# Patient Record
Sex: FEMALE | Race: BLACK OR AFRICAN AMERICAN | NOT HISPANIC OR LATINO | Employment: OTHER | URBAN - METROPOLITAN AREA
[De-identification: names, ages, dates, MRNs, and addresses within clinical notes are randomized per-mention and may not be internally consistent; named-entity substitution may affect disease eponyms.]

---

## 2017-09-25 PROBLEM — R60.0 BILATERAL LOWER EXTREMITY EDEMA: Status: ACTIVE | Noted: 2017-09-25

## 2017-09-25 PROBLEM — D63.1 ANEMIA OF CHRONIC KIDNEY FAILURE: Status: ACTIVE | Noted: 2017-09-25

## 2017-09-25 PROBLEM — N18.4 CKD (CHRONIC KIDNEY DISEASE) STAGE 4, GFR 15-29 ML/MIN: Status: ACTIVE | Noted: 2017-09-25

## 2017-09-25 PROBLEM — E11.21 DIABETIC NEPHROPATHY ASSOCIATED WITH TYPE 2 DIABETES MELLITUS: Status: ACTIVE | Noted: 2017-09-25

## 2017-09-25 PROBLEM — D50.9 IDA (IRON DEFICIENCY ANEMIA): Status: ACTIVE | Noted: 2017-09-25

## 2017-09-25 PROBLEM — I10 HTN (HYPERTENSION): Status: ACTIVE | Noted: 2017-09-25

## 2017-09-25 PROBLEM — N18.9 ANEMIA OF CHRONIC KIDNEY FAILURE: Status: ACTIVE | Noted: 2017-09-25

## 2017-09-25 PROBLEM — E88.09 HYPOALBUMINEMIA: Status: ACTIVE | Noted: 2017-09-25

## 2017-09-25 PROBLEM — E55.9 VITAMIN D DEFICIENCY: Status: ACTIVE | Noted: 2017-09-25

## 2017-09-25 PROBLEM — N25.81 SECONDARY HYPERPARATHYROIDISM OF RENAL ORIGIN: Status: ACTIVE | Noted: 2017-09-25

## 2018-08-14 ENCOUNTER — TELEPHONE (OUTPATIENT)
Dept: SURGERY | Facility: CLINIC | Age: 63
End: 2018-08-14

## 2018-08-14 NOTE — TELEPHONE ENCOUNTER
8/14/18  1:06pm  Attempted to reach patient to inform her of need to reschedule appointment today. No answer. No voicemail setup.     8/14/18  1:10pm  Attempted to reach patient to inform her of need to reschedule appointment today. No answer. Message left on answering machine.     8/14/18  1:14pm  Attempted to reach patient's daughter to inform her of need to reschedule patient's appointment today. No answer. Message left via voicemail.

## 2018-09-18 ENCOUNTER — OFFICE VISIT (OUTPATIENT)
Dept: SURGERY | Facility: CLINIC | Age: 63
End: 2018-09-18
Payer: MEDICARE

## 2018-09-18 VITALS
TEMPERATURE: 99 F | SYSTOLIC BLOOD PRESSURE: 152 MMHG | BODY MASS INDEX: 40.69 KG/M2 | DIASTOLIC BLOOD PRESSURE: 74 MMHG | HEART RATE: 81 BPM | HEIGHT: 64 IN | WEIGHT: 238.31 LBS

## 2018-09-18 DIAGNOSIS — E11.21 DIABETIC NEPHROPATHY ASSOCIATED WITH TYPE 2 DIABETES MELLITUS: ICD-10-CM

## 2018-09-18 DIAGNOSIS — N18.4 CKD (CHRONIC KIDNEY DISEASE) STAGE 4, GFR 15-29 ML/MIN: Primary | ICD-10-CM

## 2018-09-18 PROCEDURE — 99999 PR PBB SHADOW E&M-EST. PATIENT-LVL IV: CPT | Mod: PBBFAC,,, | Performed by: STUDENT IN AN ORGANIZED HEALTH CARE EDUCATION/TRAINING PROGRAM

## 2018-09-18 PROCEDURE — 99214 OFFICE O/P EST MOD 30 MIN: CPT | Mod: PBBFAC,PO | Performed by: STUDENT IN AN ORGANIZED HEALTH CARE EDUCATION/TRAINING PROGRAM

## 2018-09-18 PROCEDURE — 99204 OFFICE O/P NEW MOD 45 MIN: CPT | Mod: S$PBB,,, | Performed by: STUDENT IN AN ORGANIZED HEALTH CARE EDUCATION/TRAINING PROGRAM

## 2018-09-18 NOTE — PROGRESS NOTES
Patient ID: Belinda Kelly is a 63 y.o. female.    Chief Complaint: No chief complaint on file.      HPI:  63F with ESRD secondary to DM and htn. She was diagnosed with DM at age 28. She has never had dialysis before. She does make urine. Complains of some SOB going up a flight of stairs. No chest pain. She sees dr karimi with nephrology who referred her here for placement of embedded PD catheter for anticipation of needing PD in the future.        Review of Systems   Constitutional: Negative for fever.   HENT: Negative for trouble swallowing.    Respiratory: Positive for shortness of breath.    Cardiovascular: Positive for leg swelling. Negative for chest pain.   Gastrointestinal: Negative for abdominal pain, blood in stool, nausea and vomiting.   Genitourinary: Negative for decreased urine volume and dysuria.   Musculoskeletal: Negative for gait problem.   Skin: Negative for rash and wound.   Allergic/Immunologic: Negative for immunocompromised state.   Neurological: Negative for weakness.   Hematological: Does not bruise/bleed easily.   Psychiatric/Behavioral: Negative for agitation.       Current Outpatient Medications   Medication Sig Dispense Refill    acetaminophen (TYLENOL) 325 MG tablet Take 325 mg by mouth every 6 (six) hours as needed for Pain.      allopurinol (ZYLOPRIM) 100 MG tablet Take 1 tablet (100 mg total) by mouth once daily. 30 tablet 11    amlodipine (NORVASC) 10 MG tablet Take 10 mg by mouth once daily.      aspirin (ECOTRIN) 81 MG EC tablet Take 81 mg by mouth once daily.      calcitRIOL (ROCALTROL) 0.25 MCG Cap Take 1 capsule (0.25 mcg total) by mouth once daily. 90 capsule 3    calcium carbonate (OS-SHARON) 600 mg (1,500 mg) Tab Take 600 mg by mouth once daily.      carvedilol (COREG) 25 MG tablet Take 1 tablet (25 mg total) by mouth 2 (two) times daily. 180 tablet 3    cetirizine (ZYRTEC) 10 MG tablet TK 1 T PO QD  0    cloNIDine (CATAPRES) 0.2 MG tablet Take 0.3 mg by mouth 2  (two) times daily.      famotidine (PEPCID) 40 MG tablet Take 40 mg by mouth once daily.      ferrous sulfate 324 mg (65 mg iron) TbEC Take 325 mg by mouth every other day.      furosemide (LASIX) 80 MG tablet Take 0.5 tablets (40 mg total) by mouth once daily. 90 tablet 3    hydrALAZINE (APRESOLINE) 100 MG tablet Take 1 tablet (100 mg total) by mouth 3 (three) times daily. 270 tablet 3    HYDROcodone-acetaminophen (NORCO) 5-325 mg per tablet 2 (two) times daily as needed.  0    insulin aspart (NOVOLOG) 100 unit/mL injection Inject 15 Units into the skin 3 (three) times daily before meals.      isosorbide dinitrate (ISORDIL) 10 MG tablet Take 3 tablets (30 mg total) by mouth 3 (three) times daily. 270 tablet 3    losartan (COZAAR) 50 MG tablet Take 1 tablet (50 mg total) by mouth once daily. 90 tablet 3    minoxidil (LONITEN) 2.5 MG tablet Take 2 tablets (5 mg total) by mouth 2 (two) times daily. 120 tablet 11    sitagliptin (JANUVIA) 100 MG Tab Take 100 mg by mouth once daily.      spironolactone (ALDACTONE) 25 MG tablet Take 25 mg by mouth once daily.      tramadol (ULTRAM) 50 mg tablet Take 50 mg by mouth every 24 hours as needed for Pain.      TRESIBA FLEXTOUCH U-200 200 unit/mL (3 mL) InPn INJ 50 UNI SC QAM AND 50 UNI QPM  0    UNABLE TO FIND Inject 2 Units into the skin once daily. medication name:- Victoza 2 units in am      VENTOLIN HFA 90 mcg/actuation inhaler INHALE 2 PUFFS PO Q 6 H PRN  1     Current Facility-Administered Medications   Medication Dose Route Frequency Provider Last Rate Last Dose    perflutren protein-A microsphr 0.22 mg/mL IV susp  1 mL Intravenous Once Moni Deng MD           Review of patient's allergies indicates:   Allergen Reactions    Strawberries [strawberry] Hives and Itching       Past Medical History:   Diagnosis Date    Anemia     Chronic kidney disease-mineral and bone disorder     CKD (chronic kidney disease) stage 4, GFR 15-29 ml/min     CKD  (chronic kidney disease) stage 5, GFR less than 15 ml/min     Diabetes mellitus     Diabetic nephropathy     Disorder of kidney and ureter     Edema     Hypertension     Hyperuricemia     Hypoalbuminemia     Nephrotic syndrome     Protein malnutrition     Proteinuria     Secondary hyperparathyroidism     Secondary hyperparathyroidism of renal origin     Vitamin D deficiency        Past Surgical History:   Procedure Laterality Date    APPENDECTOMY      BUZOTL-AGRXOL-NC N/A 10/28/2015    Performed by [facility]     SECTION      HYSTERECTOMY         Family History   Problem Relation Age of Onset    Hypertension Mother     Diabetes Mother     Heart disease Mother     Hypertension Father     Diabetes Father     Liver disease Father        Social History     Socioeconomic History    Marital status: Single     Spouse name: Not on file    Number of children: Not on file    Years of education: Not on file    Highest education level: Not on file   Social Needs    Financial resource strain: Not on file    Food insecurity - worry: Not on file    Food insecurity - inability: Not on file    Transportation needs - medical: Not on file    Transportation needs - non-medical: Not on file   Occupational History    Not on file   Tobacco Use    Smoking status: Never Smoker    Smokeless tobacco: Never Used   Substance and Sexual Activity    Alcohol use: No    Drug use: No    Sexual activity: Not Currently   Other Topics Concern    Not on file   Social History Narrative    Not on file       Vitals:    18 1439   BP: (!) 152/74   Pulse: 81   Temp: 98.5 °F (36.9 °C)       Physical Exam   Constitutional: She is oriented to person, place, and time. She appears well-developed and well-nourished. No distress.   HENT:   Head: Normocephalic and atraumatic.   Eyes: No scleral icterus.   Cardiovascular: Normal rate.   Pulmonary/Chest: Effort normal. No stridor.    Abdominal: Soft. She exhibits no distension. There is no tenderness.   Lymphadenopathy:     She has no cervical adenopathy.   Neurological: She is alert and oriented to person, place, and time.   Skin: Skin is warm. No erythema.        Psychiatric: She has a normal mood and affect. Her behavior is normal.     GFR 13 from 18 a month ago and 18 a year ago      Assessment & Plan:   63F with ESRD seen in consultation for placement of PD catheter  She is agreeable at this time to have the catheter placed. Explained the procedure. She agrees to proceed  Plan for Oct 23 per patient request

## 2018-10-18 ENCOUNTER — CLINICAL SUPPORT (OUTPATIENT)
Dept: LAB | Facility: HOSPITAL | Age: 63
End: 2018-10-18
Attending: STUDENT IN AN ORGANIZED HEALTH CARE EDUCATION/TRAINING PROGRAM
Payer: MEDICARE

## 2018-10-18 ENCOUNTER — HOSPITAL ENCOUNTER (OUTPATIENT)
Dept: PREADMISSION TESTING | Facility: HOSPITAL | Age: 63
Discharge: HOME | End: 2018-10-18
Attending: INTERNAL MEDICINE
Payer: MEDICARE

## 2018-10-18 ENCOUNTER — ANESTHESIA EVENT (OUTPATIENT)
Dept: SURGERY | Facility: HOSPITAL | Age: 63
End: 2018-10-18
Payer: MEDICARE

## 2018-10-18 VITALS
RESPIRATION RATE: 18 BRPM | SYSTOLIC BLOOD PRESSURE: 183 MMHG | DIASTOLIC BLOOD PRESSURE: 89 MMHG | HEART RATE: 82 BPM | HEIGHT: 64 IN | BODY MASS INDEX: 40.44 KG/M2 | WEIGHT: 236.88 LBS | OXYGEN SATURATION: 95 % | TEMPERATURE: 98 F

## 2018-10-18 DIAGNOSIS — I10 ESSENTIAL HYPERTENSION: Primary | ICD-10-CM

## 2018-10-18 DIAGNOSIS — I10 ESSENTIAL HYPERTENSION: ICD-10-CM

## 2018-10-18 PROCEDURE — 93005 ELECTROCARDIOGRAM TRACING: CPT

## 2018-10-18 RX ORDER — LIDOCAINE HYDROCHLORIDE 10 MG/ML
1 INJECTION, SOLUTION EPIDURAL; INFILTRATION; INTRACAUDAL; PERINEURAL ONCE
Status: CANCELLED | OUTPATIENT
Start: 2018-10-18 | End: 2018-10-18

## 2018-10-18 RX ORDER — SODIUM CHLORIDE 9 MG/ML
INJECTION, SOLUTION INTRAVENOUS CONTINUOUS
Status: CANCELLED | OUTPATIENT
Start: 2018-10-18

## 2018-10-18 NOTE — ANESTHESIA PREPROCEDURE EVALUATION
10/18/2018  Belinda Kelly is a 63 y.o., female  With LUIZA not using CPAP is scheduled for laparoscopic insertion of peritoneal dialysis catheter under [facility] on 10/23/2018.    Past Surgical History:   Procedure Laterality Date    APPENDECTOMY      FKYIXJ-ASGOJX-OR N/A 10/28/2015    Performed by [facility] at [facility] OR     SECTION  ,     HYSTERECTOMY            Anesthesia Evaluation    I have reviewed the Patient Summary Reports.    I have reviewed the Nursing Notes.   I have reviewed the Medications.     Review of Systems  Anesthesia Hx:  No problems with previous Anesthesia  History of prior surgery of interest to airway management or planning: Previous anesthesia: General  Denies Personal Hx of Anesthesia complications.   Social:  Non-Smoker, No Alcohol Use    Hematology/Oncology:  Hematology Normal      Hematology Comments: Aspirin on hold starting today; will resume 24 hours after procedure   EENT/Dental:   chronic allergic rhinitis   Cardiovascular:   Exercise tolerance: good Hypertension, well controlled Denies Dysrhythmias.   Denies Angina.        Pulmonary:   Asthma (has not used rescue inhaler in over 1 year) mild and asymptomatic Denies Shortness of breath. Sleep Apnea (does not use CPAP)    Renal/:   Chronic Renal Disease, ESRD  Kidney Function/Disease, Chronic Kidney Disease (CKD) , CKD Stage IV (GFR 15-29)    Hepatic/GI:  Hepatic/GI Normal    Neurological:  Neurology Normal    Endocrine:   Diabetes, poorly controlled, type 2, using insulin  Diabetes, Type 2 Diabetes , controlled by insulin. , most recent HgA1c value was 8.4 on 2018.  Parathyroid Disease, Hyperparathyroidism, Tertiary Hyperparathyroidism        Physical Exam  General:  Morbid Obesity    Airway/Jaw/Neck:  Airway Findings: Mouth Opening: Normal Tongue: Normal  General Airway Assessment:  Adult  Mallampati: III  TM Distance: Normal, at least 6 cm  Jaw/Neck Findings:  Neck ROM: Extension Decreased, Mild  Neck Findings:  Girth Increased, Short Neck     Eyes/Ears/Nose:  EYES/EARS/NOSE FINDINGS: Normal   Dental:  Dental Findings: In tact, Periodontal disease, Mild   Chest/Lungs:  Chest/Lungs Clear    Heart/Vascular:  Heart Findings: Normal Heart murmur: negative    Abdomen:  Abdomen Findings: Normal      Mental Status:  Mental Status Findings: Normal        Anesthesia Plan  Type of Anesthesia, risks & benefits discussed:  Anesthesia Type:  general  Patient's Preference:   Intra-op Monitoring Plan:   Intra-op Monitoring Plan Comments:   Post Op Pain Control Plan:   Post Op Pain Control Plan Comments:   Induction:   IV  Beta Blocker:         Informed Consent: Patient understands risks and agrees with Anesthesia plan.  Questions answered.   ASA Score: 4     Day of Surgery Review of History & Physical:        Anesthesia Plan Notes: Anesthesia consent will be obtained prior to procedure on 10/23/2018.        Ready For Surgery From Anesthesia Perspective.

## 2018-10-18 NOTE — DISCHARGE INSTRUCTIONS
Your surgery is scheduled for 10/23.    Please report to Outpatient Surgery Intake Office on the 2nd FLOOR at 0530 a.m.          INSTRUCTIONS IMPORTANT!!!  ¨ Do not eat or drink after 12 midnight-including water. OK to brush teeth, no   gum, candy or mints!    ¨ Take only these medicines with a small swallow of water-morning of surgery  .Amlodipine, Clonidine, Carvediol        ____  Proceed to [facility] on 8/18 for additional blood test.        ____  Do not wear makeup, including mascara.  ____  No powder, lotions or creams to surgical area.  ____  Please remove all jewelry, including piercings and leave at home.  ____  No money or valuables needed. Please leave at home.  ____  Please bring any documents given by your doctor.  ____  If going home the same day, arrange for a ride home. You will not be able to             drive if Anesthesia was used.  ____  Children under 18 years require a parent / guardian present the entire time             they are in surgery / recovery.  ____  Wear loose fitting clothing. Allow for dressings, bandages.  ____  Stop Aspirin, Ibuprofen, Motrin and Aleve at least 3-5 days before surgery, unless otherwise instructed by your doctor, or the nurse.   You MAY use Tylenol/acetaminophen until day of surgery.  ____  Wash the surgical area with Hibiclens the night before surgery, and again the             morning of surgery.  Be sure to rinse hibiclens off completely (if instructed by   nurse).  ____  If you take diabetic medication, do not take am of surgery unless instructed by Doctor.  ____  Call MD for temperature above 101 degrees or any other signs of infection such as Urinary (bladder) infection, Upper respiratory infection, skin boils, etc.  ____ Stop taking any Fish Oil supplement or any Vitamins that contain Vitamin E at least 5 days prior to surgery.  ____ Do Not wear your contact lenses the day of your procedure.  You may wear your glasses.      ____Do not shave  for 3 days prior to surgery.  ____ Practice Good hand washing before, during, and after procedure.      I have read or had read and explained to me, and understand the above information.  Additional comments or instructions:  For additional questions call [phone]      Pre-Op Bathing Instructions    Before surgery, you can play an important role in your own health.    Because skin is not sterile, we need to be sure that your skin is as free of germs as possible. By following the instructions below, you can reduce the number of germs on your skin before surgery.    IMPORTANT: You will need to shower with a special soap called Hibiclens*, available at any pharmacy.  If you are allergic to Chlorhexidine (the antiseptic in Hibiclens), use an antibacterial soap such as Dial Soap for your preoperative shower.  You will shower with Hibiclens both the night before your surgery and the morning of your surgery.  Do not use Hibiclens on the head, face or genitals to avoid injury to those areas.    STEP #1: THE NIGHT BEFORE YOUR SURGERY     1. Do not shave the area of your body where your surgery will be performed.  2. Shower and wash your hair and body as usual with your normal soap and shampoo.  3. Rinse your hair and body thoroughly after you shower to remove all soap residue.  4. With your hand, apply one packet of Hibiclens soap to the surgical site.   5. Wash the site gently for five (5) minutes. Do not scrub your skin too hard.   6. Do not wash with your regular soap after Hibiclens is used.  7. Rinse your body thoroughly.  8. Pat yourself dry with a clean, soft towel.  9. Do not use lotion, cream, or powder.  10. Wear clean clothes.    STEP #2: THE MORNING OF YOUR SURGERY     1. Repeat Step #1.    * Not to be used by people allergic to Chlorhexidine.      PD Catheter Access: Placing the Catheter  Your kidneys filter and remove waste from your blood. When they fail, this work must be done some other way. Peritoneal  dialysis (PD) is a treatment that can take over when your kidneys stop working. The peritoneum is the membrane lining the inside of your (belly) abdomen. PD uses the lining of your abdomen as a filter for your blood. Before PD can be done, an opening into this lining (an access) must be made. The access for PD is a soft tube called a catheter placed into your abdomen.    Placing the catheter  · A nurse or anesthesiologist gives you medicine so you dont feel pain during surgery.  · A small opening is made just below your belly button (navel). The catheter is placed through this opening.  · One end of the catheter sits in your abdomen. A few inches of the other end comes out an exit site in your skin. This end is clamped off and capped when its not being used.  · Typically, a part of the catheter goes through a tunnel made underneath the skin before it enters your abdomen. This tunnel helps prevent infections from entering your abdomen. It also holds the catheter in place to keep it from falling out.  Date Last Reviewed: 1/1/2017 © 2000-2017 [employer]. [address] [place] [address]. All rights reserved. This information is not intended as a substitute for professional medical care. Always follow your healthcare professional's instructions.        Anesthesia: General Anesthesia     You are watched continuously during your procedure by your anesthesia provider.     Youre due to have surgery. During surgery, youll be given medicine called anesthesia or anesthetic. This will keep you comfortable and pain-free. Your anesthesia provider will use general anesthesia.  What is general anesthesia?  General anesthesia puts you into a state like deep sleep. It goes into the bloodstream (IV anesthetics), into the lungs (gas anesthetics), or both. You feel nothing during the procedure. You will not remember it. During the procedure, the anesthesia provider monitors you continuously. He or she checks  your heart rate and rhythm, blood pressure, breathing, and blood oxygen.  · IV anesthetics. IV anesthetics are given through an IV line in your arm. Theyre often given first. This is so you are asleep before a gas anesthetic is started. Some kinds of IV anesthetics relieve pain. Others relax you. Your doctor will decide which kind is best in your case.  · Gas anesthetics. Gas anesthetics are breathed into the lungs. They are often used to keep you asleep. They can be given through a facemask or a tube placed in your larynx or trachea (breathing tube).  ¨ If you have a facemask, your anesthesia provider will most likely place it over your nose and mouth while youre still awake. Youll breathe oxygen through the mask as your IV anesthetic is started. Gas anesthetic may be added through the mask.  ¨ If you have a tube in the larynx or trachea, it will be inserted into your throat after youre asleep.  Anesthesia tools and medicines  You will likely have:  · IV anesthetics. These are put into an IV line into your bloodstream.  · Gas anesthetics. You breathe these anesthetics into your lungs, where they pass into your bloodstream.  · Pulse oximeter. This is a small clip that is attached to the end of your finger. This measures your blood oxygen level.  · Electrocardiography leads (electrodes). These are small sticky pads that are placed on your chest. They record your heart rate and rhythm.  · Blood pressure cuff. This reads your blood pressure.  Risks and possible complications  General anesthesia has some risks. These include:  · Breathing problems  · Nausea and vomiting  · Sore throat or hoarseness (usually temporary)  · Allergic reaction to the anesthetic  · Irregular heartbeat (rare)  · Cardiac arrest (rare)   Anesthesia safety  · Follow all instructions you are given for how long not to eat or drink before your procedure.  · Be sure your doctor knows what medicines and drugs you take. This includes  over-the-counter medicines, herbs, supplements, alcohol or other drugs. You will be asked when those were last taken.  · Have an adult family member or friend drive you home after the procedure.  · For the first 24 hours after your surgery:  ¨ Do not drive or use heavy equipment.  ¨ Do not make important decisions or sign legal documents. If important decisions or signing legal documents is necessary during the first 24 hours after surgery, have a trusted family member or spouse act on your behalf.  ¨ Avoid alcohol.  ¨ Have a responsible adult stay with you. He or she can watch for problems and help keep you safe.  Date Last Reviewed: 12/1/2016  © 0445-9847 [employer]. [address] [place] [address], [place] [address]. All rights reserved. This information is not intended as a substitute for professional medical care. Always follow your healthcare professional's instructions.

## 2018-10-18 NOTE — PLAN OF CARE
[facility] [phone]    Allergies, medical, surgical, family and psychosocial histories reviewed with patient. Periop plan of care reviewed. Preop instructions given, including medications to take and to hold. Hibiclens soap and instructions on use given. Time allotted for questions to be addressed.  Patient verbalized understanding.

## 2018-10-23 ENCOUNTER — HOSPITAL ENCOUNTER (OUTPATIENT)
Facility: HOSPITAL | Age: 63
Discharge: HOME | End: 2018-10-23
Attending: STUDENT IN AN ORGANIZED HEALTH CARE EDUCATION/TRAINING PROGRAM | Admitting: STUDENT IN AN ORGANIZED HEALTH CARE EDUCATION/TRAINING PROGRAM
Payer: MEDICARE

## 2018-10-23 ENCOUNTER — ANESTHESIA (OUTPATIENT)
Dept: SURGERY | Facility: HOSPITAL | Age: 63
End: 2018-10-23
Payer: MEDICARE

## 2018-10-23 DIAGNOSIS — E11.21 DIABETIC NEPHROPATHY ASSOCIATED WITH TYPE 2 DIABETES MELLITUS: ICD-10-CM

## 2018-10-23 DIAGNOSIS — N18.4 CKD (CHRONIC KIDNEY DISEASE) STAGE 4, GFR 15-29 ML/MIN: Primary | ICD-10-CM

## 2018-10-23 LAB
ANION GAP SERPL CALC-SCNC: 10 MMOL/L
ANION GAP SERPL CALC-SCNC: 10 MMOL/L
BUN SERPL-MCNC: 58 MG/DL
BUN SERPL-MCNC: 58 MG/DL
CALCIUM SERPL-MCNC: 9 MG/DL
CALCIUM SERPL-MCNC: 9 MG/DL
CHLORIDE SERPL-SCNC: 105 MMOL/L
CHLORIDE SERPL-SCNC: 105 MMOL/L
CO2 SERPL-SCNC: 24 MMOL/L
CO2 SERPL-SCNC: 24 MMOL/L
CREAT SERPL-MCNC: 3.5 MG/DL
CREAT SERPL-MCNC: 3.5 MG/DL
EST. GFR  (AFRICAN AMERICAN): 15 ML/MIN/1.73 M^2
EST. GFR  (AFRICAN AMERICAN): 15 ML/MIN/1.73 M^2
EST. GFR  (NON AFRICAN AMERICAN): 13 ML/MIN/1.73 M^2
EST. GFR  (NON AFRICAN AMERICAN): 13 ML/MIN/1.73 M^2
GLUCOSE SERPL-MCNC: 259 MG/DL
GLUCOSE SERPL-MCNC: 259 MG/DL
POTASSIUM SERPL-SCNC: 4.9 MMOL/L
POTASSIUM SERPL-SCNC: 4.9 MMOL/L
SODIUM SERPL-SCNC: 139 MMOL/L
SODIUM SERPL-SCNC: 139 MMOL/L

## 2018-10-23 PROCEDURE — 25000003 PHARM REV CODE 250: Performed by: NURSE PRACTITIONER

## 2018-10-23 PROCEDURE — 80048 BASIC METABOLIC PNL TOTAL CA: CPT

## 2018-10-23 PROCEDURE — 71000015 HC POSTOP RECOV 1ST HR: Performed by: STUDENT IN AN ORGANIZED HEALTH CARE EDUCATION/TRAINING PROGRAM

## 2018-10-23 PROCEDURE — 36000708 HC OR TIME LEV III 1ST 15 MIN: Performed by: STUDENT IN AN ORGANIZED HEALTH CARE EDUCATION/TRAINING PROGRAM

## 2018-10-23 PROCEDURE — 49324 LAP INSERT TUNNEL IP CATH: CPT | Mod: ,,, | Performed by: STUDENT IN AN ORGANIZED HEALTH CARE EDUCATION/TRAINING PROGRAM

## 2018-10-23 PROCEDURE — 25000003 PHARM REV CODE 250: Performed by: STUDENT IN AN ORGANIZED HEALTH CARE EDUCATION/TRAINING PROGRAM

## 2018-10-23 PROCEDURE — C1750 CATH, HEMODIALYSIS,LONG-TERM: HCPCS | Performed by: STUDENT IN AN ORGANIZED HEALTH CARE EDUCATION/TRAINING PROGRAM

## 2018-10-23 PROCEDURE — 36000709 HC OR TIME LEV III EA ADD 15 MIN: Performed by: STUDENT IN AN ORGANIZED HEALTH CARE EDUCATION/TRAINING PROGRAM

## 2018-10-23 PROCEDURE — 63600175 PHARM REV CODE 636 W HCPCS: Performed by: NURSE ANESTHETIST, CERTIFIED REGISTERED

## 2018-10-23 PROCEDURE — 36415 COLL VENOUS BLD VENIPUNCTURE: CPT

## 2018-10-23 PROCEDURE — 63600175 PHARM REV CODE 636 W HCPCS: Performed by: ANESTHESIOLOGY

## 2018-10-23 PROCEDURE — 71000039 HC RECOVERY, EACH ADD'L HOUR: Performed by: STUDENT IN AN ORGANIZED HEALTH CARE EDUCATION/TRAINING PROGRAM

## 2018-10-23 PROCEDURE — 37000009 HC ANESTHESIA EA ADD 15 MINS: Performed by: STUDENT IN AN ORGANIZED HEALTH CARE EDUCATION/TRAINING PROGRAM

## 2018-10-23 PROCEDURE — 71000033 HC RECOVERY, INTIAL HOUR: Performed by: STUDENT IN AN ORGANIZED HEALTH CARE EDUCATION/TRAINING PROGRAM

## 2018-10-23 PROCEDURE — 71000016 HC POSTOP RECOV ADDL HR: Performed by: STUDENT IN AN ORGANIZED HEALTH CARE EDUCATION/TRAINING PROGRAM

## 2018-10-23 PROCEDURE — 37000008 HC ANESTHESIA 1ST 15 MINUTES: Performed by: STUDENT IN AN ORGANIZED HEALTH CARE EDUCATION/TRAINING PROGRAM

## 2018-10-23 PROCEDURE — 63600175 PHARM REV CODE 636 W HCPCS: Performed by: STUDENT IN AN ORGANIZED HEALTH CARE EDUCATION/TRAINING PROGRAM

## 2018-10-23 PROCEDURE — 25000003 PHARM REV CODE 250: Performed by: NURSE ANESTHETIST, CERTIFIED REGISTERED

## 2018-10-23 DEVICE — KIT PERITONEAL DIALYS 62.0CM: Type: IMPLANTABLE DEVICE | Site: ABDOMEN | Status: FUNCTIONAL

## 2018-10-23 RX ORDER — LIDOCAINE HYDROCHLORIDE 10 MG/ML
INJECTION INFILTRATION; PERINEURAL
Status: DISCONTINUED | OUTPATIENT
Start: 2018-10-23 | End: 2018-10-23 | Stop reason: HOSPADM

## 2018-10-23 RX ORDER — PROPOFOL 10 MG/ML
VIAL (ML) INTRAVENOUS
Status: DISCONTINUED | OUTPATIENT
Start: 2018-10-23 | End: 2018-10-23

## 2018-10-23 RX ORDER — SODIUM CHLORIDE 9 MG/ML
INJECTION, SOLUTION INTRAVENOUS CONTINUOUS
Status: DISCONTINUED | OUTPATIENT
Start: 2018-10-23 | End: 2018-10-23 | Stop reason: HOSPADM

## 2018-10-23 RX ORDER — LIDOCAINE HYDROCHLORIDE 10 MG/ML
1 INJECTION, SOLUTION EPIDURAL; INFILTRATION; INTRACAUDAL; PERINEURAL ONCE
Status: DISCONTINUED | OUTPATIENT
Start: 2018-10-23 | End: 2018-10-23 | Stop reason: HOSPADM

## 2018-10-23 RX ORDER — SODIUM CHLORIDE 0.9 % (FLUSH) 0.9 %
3 SYRINGE (ML) INJECTION
Status: DISCONTINUED | OUTPATIENT
Start: 2018-10-23 | End: 2018-10-23 | Stop reason: HOSPADM

## 2018-10-23 RX ORDER — NEOSTIGMINE METHYLSULFATE 1 MG/ML
INJECTION, SOLUTION INTRAVENOUS
Status: DISCONTINUED | OUTPATIENT
Start: 2018-10-23 | End: 2018-10-23

## 2018-10-23 RX ORDER — MIDAZOLAM HYDROCHLORIDE 1 MG/ML
INJECTION INTRAMUSCULAR; INTRAVENOUS
Status: DISCONTINUED | OUTPATIENT
Start: 2018-10-23 | End: 2018-10-23

## 2018-10-23 RX ORDER — GLYCOPYRROLATE 0.2 MG/ML
INJECTION INTRAMUSCULAR; INTRAVENOUS
Status: DISCONTINUED | OUTPATIENT
Start: 2018-10-23 | End: 2018-10-23

## 2018-10-23 RX ORDER — CISATRACURIUM BESYLATE 2 MG/ML
INJECTION, SOLUTION INTRAVENOUS
Status: DISCONTINUED | OUTPATIENT
Start: 2018-10-23 | End: 2018-10-23

## 2018-10-23 RX ORDER — HYDROMORPHONE HYDROCHLORIDE 2 MG/ML
0.5 INJECTION, SOLUTION INTRAMUSCULAR; INTRAVENOUS; SUBCUTANEOUS EVERY 5 MIN PRN
Status: ACTIVE | OUTPATIENT
Start: 2018-10-23 | End: 2018-10-23

## 2018-10-23 RX ORDER — ONDANSETRON 2 MG/ML
4 INJECTION INTRAMUSCULAR; INTRAVENOUS ONCE AS NEEDED
Status: DISCONTINUED | OUTPATIENT
Start: 2018-10-23 | End: 2018-10-23 | Stop reason: HOSPADM

## 2018-10-23 RX ORDER — HYDROCODONE BITARTRATE AND ACETAMINOPHEN 5; 325 MG/1; MG/1
1 TABLET ORAL EVERY 4 HOURS PRN
Qty: 10 TABLET | Refills: 0 | Status: SHIPPED | OUTPATIENT
Start: 2018-10-23 | End: 2018-11-02 | Stop reason: SDUPTHER

## 2018-10-23 RX ORDER — SUCCINYLCHOLINE CHLORIDE 20 MG/ML
INJECTION INTRAMUSCULAR; INTRAVENOUS
Status: DISCONTINUED | OUTPATIENT
Start: 2018-10-23 | End: 2018-10-23

## 2018-10-23 RX ORDER — BUPIVACAINE HYDROCHLORIDE 2.5 MG/ML
INJECTION, SOLUTION EPIDURAL; INFILTRATION; INTRACAUDAL
Status: DISCONTINUED | OUTPATIENT
Start: 2018-10-23 | End: 2018-10-23 | Stop reason: HOSPADM

## 2018-10-23 RX ORDER — FENTANYL CITRATE 50 UG/ML
INJECTION, SOLUTION INTRAMUSCULAR; INTRAVENOUS
Status: DISCONTINUED | OUTPATIENT
Start: 2018-10-23 | End: 2018-10-23

## 2018-10-23 RX ORDER — HEPARIN SODIUM 5000 [USP'U]/ML
INJECTION, SOLUTION INTRAVENOUS; SUBCUTANEOUS
Status: DISCONTINUED | OUTPATIENT
Start: 2018-10-23 | End: 2018-10-23 | Stop reason: HOSPADM

## 2018-10-23 RX ORDER — LIDOCAINE HCL/PF 100 MG/5ML
SYRINGE (ML) INTRAVENOUS
Status: DISCONTINUED | OUTPATIENT
Start: 2018-10-23 | End: 2018-10-23

## 2018-10-23 RX ORDER — HYDROMORPHONE HYDROCHLORIDE 2 MG/ML
0.5 INJECTION, SOLUTION INTRAMUSCULAR; INTRAVENOUS; SUBCUTANEOUS EVERY 5 MIN PRN
Status: DISPENSED | OUTPATIENT
Start: 2018-10-23 | End: 2018-10-23

## 2018-10-23 RX ORDER — HYDROCODONE BITARTRATE AND ACETAMINOPHEN 5; 325 MG/1; MG/1
1 TABLET ORAL EVERY 4 HOURS PRN
Status: DISCONTINUED | OUTPATIENT
Start: 2018-10-23 | End: 2018-10-23 | Stop reason: HOSPADM

## 2018-10-23 RX ORDER — SODIUM CHLORIDE 0.9 % (FLUSH) 0.9 %
3 SYRINGE (ML) INJECTION EVERY 8 HOURS
Status: DISCONTINUED | OUTPATIENT
Start: 2018-10-23 | End: 2018-10-23 | Stop reason: HOSPADM

## 2018-10-23 RX ORDER — CEFAZOLIN SODIUM 2 G/50ML
2 SOLUTION INTRAVENOUS
Status: COMPLETED | OUTPATIENT
Start: 2018-10-23 | End: 2018-10-23

## 2018-10-23 RX ORDER — ONDANSETRON HYDROCHLORIDE 2 MG/ML
INJECTION, SOLUTION INTRAMUSCULAR; INTRAVENOUS
Status: DISCONTINUED | OUTPATIENT
Start: 2018-10-23 | End: 2018-10-23

## 2018-10-23 RX ADMIN — CISATRACURIUM BESYLATE 8 MG: 2 INJECTION INTRAVENOUS at 07:10

## 2018-10-23 RX ADMIN — SUCCINYLCHOLINE CHLORIDE 120 MG: 20 INJECTION, SOLUTION INTRAMUSCULAR; INTRAVENOUS at 07:10

## 2018-10-23 RX ADMIN — NEOSTIGMINE METHYLSULFATE 3 MG: 1 INJECTION, SOLUTION INTRAVENOUS at 08:10

## 2018-10-23 RX ADMIN — GLYCOPYRROLATE 0.2 MG: 0.2 INJECTION, SOLUTION INTRAMUSCULAR; INTRAVENOUS at 07:10

## 2018-10-23 RX ADMIN — HYDROMORPHONE HYDROCHLORIDE 0.5 MG: 2 INJECTION INTRAMUSCULAR; INTRAVENOUS; SUBCUTANEOUS at 10:10

## 2018-10-23 RX ADMIN — MIDAZOLAM HYDROCHLORIDE 2 MG: 1 INJECTION INTRAMUSCULAR; INTRAVENOUS at 06:10

## 2018-10-23 RX ADMIN — FENTANYL CITRATE 100 MCG: 50 INJECTION, SOLUTION INTRAMUSCULAR; INTRAVENOUS at 07:10

## 2018-10-23 RX ADMIN — Medication 100 MG: at 07:10

## 2018-10-23 RX ADMIN — NEOSTIGMINE METHYLSULFATE 2 MG: 1 INJECTION, SOLUTION INTRAVENOUS at 08:10

## 2018-10-23 RX ADMIN — GLYCOPYRROLATE 0.6 MG: 0.2 INJECTION, SOLUTION INTRAMUSCULAR; INTRAVENOUS at 08:10

## 2018-10-23 RX ADMIN — PROPOFOL 100 MG: 10 INJECTION, EMULSION INTRAVENOUS at 07:10

## 2018-10-23 RX ADMIN — ONDANSETRON HYDROCHLORIDE 8 MG: 2 INJECTION, SOLUTION INTRAMUSCULAR; INTRAVENOUS at 08:10

## 2018-10-23 RX ADMIN — GLYCOPYRROLATE 0.2 MG: 0.2 INJECTION, SOLUTION INTRAMUSCULAR; INTRAVENOUS at 08:10

## 2018-10-23 RX ADMIN — SODIUM CHLORIDE: 0.9 INJECTION, SOLUTION INTRAVENOUS at 06:10

## 2018-10-23 RX ADMIN — CISATRACURIUM BESYLATE 2 MG: 2 INJECTION INTRAVENOUS at 07:10

## 2018-10-23 RX ADMIN — HYDROCODONE BITARTRATE AND ACETAMINOPHEN 1 TABLET: 5; 325 TABLET ORAL at 10:10

## 2018-10-23 RX ADMIN — CEFAZOLIN SODIUM 2 G: 2 SOLUTION INTRAVENOUS at 07:10

## 2018-10-23 NOTE — PLAN OF CARE
Dr Velasquez at bedside to assess dressing. Stated drainage is fluid to irrigate during procedure, and to change dressing with gauze and tape if anymore drainage noted.

## 2018-10-23 NOTE — ANESTHESIA POSTPROCEDURE EVALUATION
"Anesthesia Post Evaluation    Patient: Belinda Kelly    Procedure(s) Performed: Procedure(s) (LRB):  INSERTION, CATHETER, DIALYSIS, PERITONEAL, LAPAROSCOPIC (N/A)    Final Anesthesia Type: general  Patient location during evaluation: PACU  Patient participation: Yes- Able to Participate  Level of consciousness: awake and alert  Post-procedure vital signs: reviewed and stable  Pain management: adequate  Airway patency: patent  PONV status at discharge: No PONV  Anesthetic complications: no      Cardiovascular status: blood pressure returned to baseline  Respiratory status: unassisted  Hydration status: euvolemic          Visit Vitals  BP (!) 169/79   Pulse 64   Temp 37.1 °C (98.8 °F) (Temporal)   Resp 12   Ht 5' 4" (1.626 m)   Wt 107 kg (236 lb)   LMP  (LMP Unknown)   SpO2 96%   BMI 40.51 kg/m²       Pain/Dylan Score: Pain Assessment Performed: Yes (10/23/2018  9:10 AM)  Presence of Pain: denies (10/23/2018  9:10 AM)  Dylan Score: 8 (10/23/2018  9:10 AM)        "
none

## 2018-10-23 NOTE — OP NOTE
DATE OF PROCEDURE: 10/23/2018    PREOPERATIVE DIAGNOSIS: CKD V    POSTOPERATIVE DIAGNOSIS: CKD V    PROCEDURE: Laparoscopic placement of imbedded peritoneal dialysis catheter, lysis of adhesions    SURGEON: Fransico Velasquez M.D.    ASSISTANT: None.    ANESTHESIA: General endotracheal prep chlorhexidine.    ESTIMATED BLOOD LOSS: Minimal.    SPECIMENS: None.    INDICATIONS: The patient is an 63 y.o. female, who has CKD V and is planning to initiate peritoneal dialysis. The risks of surgery were discussed with   the patient including bleeding, infection, pain, scar, wound complications,   injury to local structures, wanting more extensive surgery, and potential need   for further surgery. The patient demonstrated understanding of these risks and   a consent form was obtained.    PROCEDURE: The patient was identified in the Preoperative Unit and taken back    to the Operating Room, laid supine on the operating room table. IV antibiotics    were administered prior to the induction of general anesthesia. General    anesthesia was induced without complication. The patient was then prepped and    draped in a standard sterile fashion. Local anesthesia was infiltrated into the  skin and subcutaneous tissues to all the incision site. A 5 mm incision was    made in the Left upper quadrant with an #11 blade scalpel. visiport entrance was    Performed without injury.  There was noted to be some adhesions from previous surgery. These were taken down using blunt dissection.  An 8mm trocar was placed in the left lateral supra-umbilical location. It was tunneled  In the posterior rectus sheath for a few centimeters before entry into the peritoneum.    The catheter was then Positioned into the deep pelvis space.    It then tunneled in the subcutaneous space to a site superior and    lateral. The 2 cuffs were confirmed to be in the posterior rectus sheath and in    the subcutaneous space. The catheter was flushed and aspirated and then     positioned to gravity and good back flow was appreciated. The catheter tip was then buried and brought out through a LLQ incision. The tip was tied off with 0 silk after being flush with heparin. The skin incisions were  closed using 4-0 Monocryl suture in interrupted fashion. Sterile dressing was applied to the catheter. The patient was awakened    from general anesthesia without complication and returned to the Postoperative    Recovery Unit in stable condition. At the end of the case, sponge, instrument    and needle counts were correct on 2 occasions. I was present and scrubbed    throughout the entirety of the case.        COMPLICATIONS: None.    CONDITION: Stable.    IMPLANTS: Peritoneal dialysis catheter.

## 2018-10-23 NOTE — TRANSFER OF CARE
"Anesthesia Transfer of Care Note    Patient: Belinda Kelly    Procedure(s) Performed: Procedure(s) (LRB):  INSERTION, CATHETER, DIALYSIS, PERITONEAL, LAPAROSCOPIC (N/A)    Patient location: PACU    Anesthesia Type: general    Transport from OR: Transported from OR on 6-10 L/min O2 by face mask with adequate spontaneous ventilation    Post pain: adequate analgesia    Post assessment: no apparent anesthetic complications and tolerated procedure well    Post vital signs: stable    Level of consciousness: awake and oriented    Nausea/Vomiting: no nausea/vomiting    Complications: none    Transfer of care protocol was followed      Last vitals:   Visit Vitals  BP (!) 177/74 (BP Location: Left arm, Patient Position: Lying)   Pulse 72   Temp 36.1 °C (97 °F) (Temporal)   Resp 20   Ht 5' 4" (1.626 m)   Wt 107 kg (236 lb)   LMP  (LMP Unknown)   SpO2 95%   BMI 40.51 kg/m²     "
No

## 2018-10-23 NOTE — H&P
Chief Complaint: No chief complaint on file.        HPI:  63F with ESRD secondary to DM and htn. She was diagnosed with DM at age 28. She has never had dialysis before. She does make urine. Complains of some SOB going up a flight of stairs. No chest pain. She sees dr karimi with nephrology who referred her here for placement of embedded PD catheter for anticipation of needing PD in the future.           Review of Systems   Constitutional: Negative for fever.   HENT: Negative for trouble swallowing.    Respiratory: Positive for shortness of breath.    Cardiovascular: Positive for leg swelling. Negative for chest pain.   Gastrointestinal: Negative for abdominal pain, blood in stool, nausea and vomiting.   Genitourinary: Negative for decreased urine volume and dysuria.   Musculoskeletal: Negative for gait problem.   Skin: Negative for rash and wound.   Allergic/Immunologic: Negative for immunocompromised state.   Neurological: Negative for weakness.   Hematological: Does not bruise/bleed easily.   Psychiatric/Behavioral: Negative for agitation.                Current Outpatient Medications   Medication Sig Dispense Refill    acetaminophen (TYLENOL) 325 MG tablet Take 325 mg by mouth every 6 (six) hours as needed for Pain.        allopurinol (ZYLOPRIM) 100 MG tablet Take 1 tablet (100 mg total) by mouth once daily. 30 tablet 11    amlodipine (NORVASC) 10 MG tablet Take 10 mg by mouth once daily.        aspirin (ECOTRIN) 81 MG EC tablet Take 81 mg by mouth once daily.        calcitRIOL (ROCALTROL) 0.25 MCG Cap Take 1 capsule (0.25 mcg total) by mouth once daily. 90 capsule 3    calcium carbonate (OS-SHARON) 600 mg (1,500 mg) Tab Take 600 mg by mouth once daily.        carvedilol (COREG) 25 MG tablet Take 1 tablet (25 mg total) by mouth 2 (two) times daily. 180 tablet 3    cetirizine (ZYRTEC) 10 MG tablet TK 1 T PO QD   0    cloNIDine (CATAPRES) 0.2 MG tablet Take 0.3 mg by mouth 2 (two) times daily.         famotidine (PEPCID) 40 MG tablet Take 40 mg by mouth once daily.        ferrous sulfate 324 mg (65 mg iron) TbEC Take 325 mg by mouth every other day.        furosemide (LASIX) 80 MG tablet Take 0.5 tablets (40 mg total) by mouth once daily. 90 tablet 3    hydrALAZINE (APRESOLINE) 100 MG tablet Take 1 tablet (100 mg total) by mouth 3 (three) times daily. 270 tablet 3    HYDROcodone-acetaminophen (NORCO) 5-325 mg per tablet 2 (two) times daily as needed.   0    insulin aspart (NOVOLOG) 100 unit/mL injection Inject 15 Units into the skin 3 (three) times daily before meals.        isosorbide dinitrate (ISORDIL) 10 MG tablet Take 3 tablets (30 mg total) by mouth 3 (three) times daily. 270 tablet 3    losartan (COZAAR) 50 MG tablet Take 1 tablet (50 mg total) by mouth once daily. 90 tablet 3    minoxidil (LONITEN) 2.5 MG tablet Take 2 tablets (5 mg total) by mouth 2 (two) times daily. 120 tablet 11    sitagliptin (JANUVIA) 100 MG Tab Take 100 mg by mouth once daily.        spironolactone (ALDACTONE) 25 MG tablet Take 25 mg by mouth once daily.        tramadol (ULTRAM) 50 mg tablet Take 50 mg by mouth every 24 hours as needed for Pain.        TRESIBA FLEXTOUCH U-200 200 unit/mL (3 mL) InPn INJ 50 UNI SC QAM AND 50 UNI QPM   0    UNABLE TO FIND Inject 2 Units into the skin once daily. medication name:- Victoza 2 units in am        VENTOLIN HFA 90 mcg/actuation inhaler INHALE 2 PUFFS PO Q 6 H PRN   1                Current Facility-Administered Medications   Medication Dose Route Frequency Provider Last Rate Last Dose    perflutren protein-A microsphr 0.22 mg/mL IV susp  1 mL Intravenous Once Moni Deng MD                  Review of patient's allergies indicates:   Allergen Reactions    Strawberries [strawberry] Hives and Itching              Past Medical History:   Diagnosis Date    Anemia      Chronic kidney disease-mineral and bone disorder      CKD (chronic kidney disease) stage 4, GFR 15-29  ml/min      CKD (chronic kidney disease) stage 5, GFR less than 15 ml/min      Diabetes mellitus      Diabetic nephropathy      Disorder of kidney and ureter      Edema      Hypertension      Hyperuricemia      Hypoalbuminemia      Nephrotic syndrome      Protein malnutrition      Proteinuria      Secondary hyperparathyroidism      Secondary hyperparathyroidism of renal origin      Vitamin D deficiency                 Past Surgical History:   Procedure Laterality Date    APPENDECTOMY        [id number] N/A 10/28/2015     Performed by [facility]     SECTION        HYSTERECTOMY                   Family History   Problem Relation Age of Onset    Hypertension Mother      Diabetes Mother      Heart disease Mother      Hypertension Father      Diabetes Father      Liver disease Father           Social History            Socioeconomic History    Marital status: Single       Spouse name: Not on file    Number of children: Not on file    Years of education: Not on file    Highest education level: Not on file   Social Needs    Financial resource strain: Not on file    Food insecurity - worry: Not on file    Food insecurity - inability: Not on file    Transportation needs - medical: Not on file    Transportation needs - non-medical: Not on file   Occupational History    Not on file   Tobacco Use    Smoking status: Never Smoker    Smokeless tobacco: Never Used   Substance and Sexual Activity    Alcohol use: No    Drug use: No    Sexual activity: Not Currently   Other Topics Concern    Not on file   Social History Narrative    Not on file             Vitals:     18 1439   BP: (!) 152/74   Pulse: 81   Temp: 98.5 °F (36.9 °C)         Physical Exam   Constitutional: She is oriented to person, place, and time. She appears well-developed and well-nourished. No distress.   HENT:   Head: Normocephalic and atraumatic.   Eyes: No scleral icterus.    Cardiovascular: Normal rate.   Pulmonary/Chest: Effort normal. No stridor.   Abdominal: Soft. She exhibits no distension. There is no tenderness.   Lymphadenopathy:     She has no cervical adenopathy.   Neurological: She is alert and oriented to person, place, and time.   Skin: Skin is warm. No erythema.        Psychiatric: She has a normal mood and affect. Her behavior is normal.      GFR 13 from 18 a month ago and 18 a year ago        Assessment & Plan:   63F with ESRD seen in consultation for placement of PD catheter  She is agreeable at this time to have the catheter placed. Explained the procedure. She agrees to proceed  Plan for Oct 23 per patient request

## 2018-10-23 NOTE — DISCHARGE INSTRUCTIONS
ANESTHESIA  -For the first 24 hours after surgery:  Do not drive, use heavy equipment, make important decisions, or drink alcohol  -It is normal to feel sleepy for several hours.  Rest until you are more awake.  -Have someone stay with you, if needed.  They can watch for problems and help keep you safe.  -Some possible post anesthesia side effects include: nausea and vomiting, sore throat and hoarseness, sleepiness, and dizziness.    PAIN  -If you have pain after surgery, pain medicine will help you feel better.  Take it as directed, before pain becomes severe.  Most pain relievers taken by mouth need at least 20-30 minutes to start working.  -Do not drive or drink alcohol while taking pain medicine.  -Pain medication can upset your stomach.  Taking them with a little food may help.  -Other ways to help control pain: elevation, ice, and relaxation  -Call your surgeon if still having unmanageable pain an hour after taking pain medicine.  -Pain medicine can cause constipation.  Taking an over-the counter stool softener while on prescription pain medicine and drinking plenty of fluids can prevent this side effect.  -Call your surgeon if you have severe side effects like: breathing problems, trouble waking up, dizziness, confusion, or severe constipation.    NAUSEA  -Some people have nausea after surgery.  This is often because of anesthesia, pain, pain medicine, or the stress of surgery.  -Do not push yourself to eat.  Start off with clear liquids and soup.  Slowly move to solid foods.  Don't eat fatty, rich, spicy foods at first.  Eat smaller amounts.  -If you develop persistent nausea and vomiting please notify your surgeon immediately.    BLEEDING  -Different types of surgery require different types of care and dressing changes.  It is important to follow all instructions and advice from your surgeon.  Change dressing as directed.  Call your surgeon for any concerns regarding postop bleeding.    SIGNS OF  INFECTION  -Signs of infection include: fever, swelling, drainage, and redness  -Notify your surgeon if you have a fever of 100.4 F (38.0 C) or higher.  -Notify your surgeon if you notice redness, swelling, increased pain, pus, or a foul smell at the incision site.

## 2018-10-24 VITALS
DIASTOLIC BLOOD PRESSURE: 95 MMHG | OXYGEN SATURATION: 94 % | BODY MASS INDEX: 40.29 KG/M2 | HEART RATE: 66 BPM | SYSTOLIC BLOOD PRESSURE: 153 MMHG | WEIGHT: 236 LBS | RESPIRATION RATE: 16 BRPM | HEIGHT: 64 IN | TEMPERATURE: 98 F

## 2018-11-01 NOTE — DISCHARGE SUMMARY
[facility]  Short Stay  Discharge Summary    Admit Date: 10/23/2018    Discharge Date and Time: 10/23/2018 12:06 PM      Discharge Attending Physician: Mady att. providers found     Hospital Course (synopsis of major diagnoses, care, treatment, and services provided during the course of the hospital stay): Patient brought in for elective surgery. Underwent procedure without complication and was discharged.       Final Diagnoses:    Principal Problem: CKD (chronic kidney disease) stage 4, GFR 15-29 ml/min   Secondary Diagnoses:   Active Hospital Problems    Diagnosis  POA    *CKD (chronic kidney disease) stage 4, GFR 15-29 ml/min [N18.4]  Yes      Resolved Hospital Problems   No resolved problems to display.       Discharged Condition: stable    Disposition: Home or Self Care    Follow up/Patient Instructions:    Medications:  Reconciled Home Medications:      Medication List      CHANGE how you take these medications    * HYDROcodone-acetaminophen 5-325 mg per tablet  Commonly known as:  NORCO  2 (two) times daily as needed.  What changed:  Another medication with the same name was added. Make sure you understand how and when to take each.     * HYDROcodone-acetaminophen 5-325 mg per tablet  Commonly known as:  NORCO  Take 1 tablet by mouth every 4 (four) hours as needed for Pain.  What changed:  You were already taking a medication with the same name, and this prescription was added. Make sure you understand how and when to take each.         * This list has 2 medication(s) that are the same as other medications prescribed for you. Read the directions carefully, and ask your doctor or other care provider to review them with you.            CONTINUE taking these medications    acetaminophen 325 MG tablet  Commonly known as:  TYLENOL  Take 325 mg by mouth every 6 (six) hours as needed for Pain.     allopurinol 100 MG tablet  Commonly known as:  ZYLOPRIM  Take 1 tablet (100 mg total) by mouth once  daily.     amLODIPine 10 MG tablet  Commonly known as:  NORVASC  Take 10 mg by mouth once daily.     aspirin 81 MG EC tablet  Commonly known as:  ECOTRIN  Take 81 mg by mouth once daily.     calcitRIOL 0.25 MCG Cap  Commonly known as:  ROCALTROL  Take 1 capsule (0.25 mcg total) by mouth once daily.     calcium carbonate 600 mg calcium (1,500 mg) Tab  Commonly known as:  OS-SHARON  Take 600 mg by mouth once daily.     carvedilol 25 MG tablet  Commonly known as:  COREG  Take 1 tablet (25 mg total) by mouth 2 (two) times daily.     cetirizine 10 MG tablet  Commonly known as:  ZYRTEC  TK 1 T PO QD     cloNIDine 0.2 MG tablet  Commonly known as:  CATAPRES  Take 0.3 mg by mouth 2 (two) times daily.     famotidine 40 MG tablet  Commonly known as:  PEPCID  Take 40 mg by mouth once daily.     ferrous sulfate 324 mg (65 mg iron) Tbec  Take 325 mg by mouth every other day.     furosemide 80 MG tablet  Commonly known as:  LASIX  Take 0.5 tablets (40 mg total) by mouth once daily.     insulin aspart U-100 100 unit/mL injection  Commonly known as:  NOVOLOG  Inject 15 Units into the skin 3 (three) times daily before meals.     spironolactone 25 MG tablet  Commonly known as:  ALDACTONE  Take 25 mg by mouth once daily.     UNABLE TO FIND  Inject 2 Units into the skin once daily. medication name:- Victoza 2 units in am     VENTOLIN HFA 90 mcg/actuation inhaler  Generic drug:  albuterol  INHALE 2 PUFFS PO Q 6 H PRN          Discharge Procedure Orders   Diet general     Call MD for:  severe uncontrolled pain     Call MD for:  persistent nausea and vomiting     Call MD for:  temperature >100.4     Remove dressing in 48 hours     Activity as tolerated     Shower on day dressing removed (No bath)     Follow-up Information     Fransico Velasquez MD.    Specialties:  Surgery, General Surgery  Contact information:  [address]  [address]  [place] [address] [phone]

## 2018-11-02 ENCOUNTER — OFFICE VISIT (OUTPATIENT)
Dept: SURGERY | Facility: CLINIC | Age: 63
End: 2018-11-02
Payer: MEDICARE

## 2018-11-02 VITALS
SYSTOLIC BLOOD PRESSURE: 146 MMHG | WEIGHT: 232.69 LBS | HEART RATE: 79 BPM | DIASTOLIC BLOOD PRESSURE: 78 MMHG | HEIGHT: 64 IN | TEMPERATURE: 98 F | BODY MASS INDEX: 39.73 KG/M2

## 2018-11-02 DIAGNOSIS — N18.4 CKD (CHRONIC KIDNEY DISEASE) STAGE 4, GFR 15-29 ML/MIN: Primary | ICD-10-CM

## 2018-11-02 PROCEDURE — 99024 POSTOP FOLLOW-UP VISIT: CPT | Mod: POP,,, | Performed by: STUDENT IN AN ORGANIZED HEALTH CARE EDUCATION/TRAINING PROGRAM

## 2018-11-02 PROCEDURE — 99213 OFFICE O/P EST LOW 20 MIN: CPT | Mod: PBBFAC,PO | Performed by: STUDENT IN AN ORGANIZED HEALTH CARE EDUCATION/TRAINING PROGRAM

## 2018-11-02 PROCEDURE — 99999 PR PBB SHADOW E&M-EST. PATIENT-LVL III: CPT | Mod: PBBFAC,,, | Performed by: STUDENT IN AN ORGANIZED HEALTH CARE EDUCATION/TRAINING PROGRAM

## 2018-11-02 RX ORDER — HYDROCODONE BITARTRATE AND ACETAMINOPHEN 5; 325 MG/1; MG/1
1 TABLET ORAL EVERY 4 HOURS PRN
Qty: 10 TABLET | Refills: 0 | Status: SHIPPED | OUTPATIENT
Start: 2018-11-02 | End: 2019-07-29 | Stop reason: SDUPTHER

## 2018-11-02 NOTE — PROGRESS NOTES
Doing well, no SOB  Some pain around trocar sites, improving somewhat  Ambulating    Incisions healing ok  Mild TTP  Some bruising    PD cath palpable just under skin to imbedded site  RTC when renal ready to start PD. Will externalize in office

## 2019-04-30 ENCOUNTER — LAB VISIT (OUTPATIENT)
Dept: LAB | Facility: HOSPITAL | Age: 64
End: 2019-04-30
Attending: INTERNAL MEDICINE
Payer: MEDICARE

## 2019-04-30 DIAGNOSIS — N18.5 CKD (CHRONIC KIDNEY DISEASE), SYMPTOM MANAGEMENT ONLY, STAGE 5: ICD-10-CM

## 2019-04-30 LAB
ALBUMIN SERPL BCP-MCNC: 2.6 G/DL (ref 3.5–5.2)
ANION GAP SERPL CALC-SCNC: 7 MMOL/L (ref 8–16)
BUN SERPL-MCNC: 70 MG/DL (ref 8–23)
CALCIUM SERPL-MCNC: 9.1 MG/DL (ref 8.7–10.5)
CHLORIDE SERPL-SCNC: 111 MMOL/L (ref 95–110)
CO2 SERPL-SCNC: 24 MMOL/L (ref 23–29)
CREAT SERPL-MCNC: 4.7 MG/DL (ref 0.5–1.4)
EST. GFR  (AFRICAN AMERICAN): 11 ML/MIN/1.73 M^2
EST. GFR  (NON AFRICAN AMERICAN): 9 ML/MIN/1.73 M^2
GLUCOSE SERPL-MCNC: 188 MG/DL (ref 70–110)
PHOSPHATE SERPL-MCNC: 5.4 MG/DL (ref 2.7–4.5)
POTASSIUM SERPL-SCNC: 4.3 MMOL/L (ref 3.5–5.1)
SODIUM SERPL-SCNC: 142 MMOL/L (ref 136–145)

## 2019-04-30 PROCEDURE — 36415 COLL VENOUS BLD VENIPUNCTURE: CPT

## 2019-04-30 PROCEDURE — 80069 RENAL FUNCTION PANEL: CPT

## 2019-07-15 ENCOUNTER — HOSPITAL ENCOUNTER (OUTPATIENT)
Dept: RADIOLOGY | Facility: HOSPITAL | Age: 64
Discharge: HOME | End: 2019-07-15
Attending: INTERNAL MEDICINE
Payer: MEDICARE

## 2019-07-15 DIAGNOSIS — N18.5 CKD (CHRONIC KIDNEY DISEASE), SYMPTOM MANAGEMENT ONLY, STAGE 5: ICD-10-CM

## 2019-07-15 PROCEDURE — 71046 X-RAY EXAM CHEST 2 VIEWS: CPT | Mod: TC,FY

## 2019-07-15 PROCEDURE — 71046 XR CHEST PA AND LATERAL: ICD-10-PCS | Mod: 26,,, | Performed by: RADIOLOGY

## 2019-07-15 PROCEDURE — 71046 X-RAY EXAM CHEST 2 VIEWS: CPT | Mod: 26,,, | Performed by: RADIOLOGY

## 2019-07-16 ENCOUNTER — TELEPHONE (OUTPATIENT)
Dept: SURGERY | Facility: CLINIC | Age: 64
End: 2019-07-16

## 2019-07-16 NOTE — TELEPHONE ENCOUNTER
----- Message from Fransico Velasquez MD sent at 7/16/2019  8:50 AM CDT -----  This lady needs to come see me sometime for PD cath externalization    7/16/19  1:16pm  Spoke to patient regarding above message. Patient scheduled for in-office procedure on Mon 7/29/19 as requested.

## 2019-07-29 ENCOUNTER — OFFICE VISIT (OUTPATIENT)
Dept: SURGERY | Facility: CLINIC | Age: 64
End: 2019-07-29
Payer: MEDICARE

## 2019-07-29 VITALS
HEART RATE: 84 BPM | DIASTOLIC BLOOD PRESSURE: 100 MMHG | SYSTOLIC BLOOD PRESSURE: 220 MMHG | HEIGHT: 64 IN | WEIGHT: 234.13 LBS | BODY MASS INDEX: 39.97 KG/M2 | TEMPERATURE: 97 F

## 2019-07-29 DIAGNOSIS — N18.5 STAGE 5 CHRONIC KIDNEY DISEASE NOT ON CHRONIC DIALYSIS: Primary | ICD-10-CM

## 2019-07-29 PROCEDURE — 49436 PR CREATE EXIT SITE INTRAPERITONEAL CATH, DELAYED: ICD-10-PCS | Mod: S$PBB,,, | Performed by: STUDENT IN AN ORGANIZED HEALTH CARE EDUCATION/TRAINING PROGRAM

## 2019-07-29 PROCEDURE — 99214 OFFICE O/P EST MOD 30 MIN: CPT | Mod: S$PBB,25,, | Performed by: STUDENT IN AN ORGANIZED HEALTH CARE EDUCATION/TRAINING PROGRAM

## 2019-07-29 PROCEDURE — 99213 OFFICE O/P EST LOW 20 MIN: CPT | Mod: PBBFAC,PO,25 | Performed by: STUDENT IN AN ORGANIZED HEALTH CARE EDUCATION/TRAINING PROGRAM

## 2019-07-29 PROCEDURE — 49436 EMBEDDED IP CATH EXIT-SITE: CPT | Mod: PBBFAC,PO | Performed by: STUDENT IN AN ORGANIZED HEALTH CARE EDUCATION/TRAINING PROGRAM

## 2019-07-29 PROCEDURE — 99999 PR PBB SHADOW E&M-EST. PATIENT-LVL III: CPT | Mod: PBBFAC,,, | Performed by: STUDENT IN AN ORGANIZED HEALTH CARE EDUCATION/TRAINING PROGRAM

## 2019-07-29 PROCEDURE — 49436 EMBEDDED IP CATH EXIT-SITE: CPT | Mod: S$PBB,,, | Performed by: STUDENT IN AN ORGANIZED HEALTH CARE EDUCATION/TRAINING PROGRAM

## 2019-07-29 PROCEDURE — 99214 PR OFFICE/OUTPT VISIT, EST, LEVL IV, 30-39 MIN: ICD-10-PCS | Mod: S$PBB,25,, | Performed by: STUDENT IN AN ORGANIZED HEALTH CARE EDUCATION/TRAINING PROGRAM

## 2019-07-29 PROCEDURE — 99999 PR PBB SHADOW E&M-EST. PATIENT-LVL III: ICD-10-PCS | Mod: PBBFAC,,, | Performed by: STUDENT IN AN ORGANIZED HEALTH CARE EDUCATION/TRAINING PROGRAM

## 2019-07-29 RX ORDER — HYDROCODONE BITARTRATE AND ACETAMINOPHEN 5; 325 MG/1; MG/1
1 TABLET ORAL EVERY 4 HOURS PRN
Qty: 5 TABLET | Refills: 0 | Status: SHIPPED | OUTPATIENT
Start: 2019-07-29 | End: 2020-09-01

## 2019-07-29 NOTE — PROGRESS NOTES
Patient ID: Belinda Kelly is a 64 y.o. female.    Chief Complaint: No chief complaint on file.      HPI:  64F with CKD here for downtrending GFR. Discussed with nephro and plan to start PD after embedded PD catheter placed by me 8-9 months ago. Has been doing well. Feels well today. Still makes urine. Htn not well controlled, denies HA, CP, SOB. Took BP meds this morning. Here for externalization of PD catheter.       Review of Systems   Constitutional: Negative for fever.   HENT: Negative for trouble swallowing.    Respiratory: Negative for shortness of breath.    Cardiovascular: Negative for chest pain.   Gastrointestinal: Negative for abdominal pain, blood in stool, nausea and vomiting.   Genitourinary: Negative for dysuria.   Musculoskeletal: Negative for gait problem.   Skin: Negative for rash and wound.   Allergic/Immunologic: Negative for immunocompromised state.   Neurological: Negative for weakness.   Hematological: Does not bruise/bleed easily.   Psychiatric/Behavioral: Negative for agitation.       Current Outpatient Medications   Medication Sig Dispense Refill    acetaminophen (TYLENOL) 325 MG tablet Take 325 mg by mouth every 6 (six) hours as needed for Pain.      allopurinol (ZYLOPRIM) 100 MG tablet Take 100 mg by mouth once daily.      aspirin (ECOTRIN) 81 MG EC tablet Take 81 mg by mouth once daily.      calcium-vitamin D3 (OS-SHARON 500 + D3) 500 mg(1,250mg) -200 unit per tablet Take 1 tablet by mouth once daily.      carvedilol (COREG) 25 MG tablet Take 1 tablet (25 mg total) by mouth 2 (two) times daily. 180 tablet 3    cetirizine (ZYRTEC) 10 MG tablet TK 1 T PO QD  0    cloNIDine (CATAPRES) 0.3 MG tablet Take 1 tablet (0.3 mg total) by mouth 3 (three) times daily. (Patient taking differently: Take 0.1 mg by mouth 2 (two) times daily. ) 270 tablet 3    furosemide (LASIX) 80 MG tablet Take 0.5 tablets (40 mg total) by mouth once daily. 90 tablet 3    hydrALAZINE (APRESOLINE) 100 MG tablet  Take 1 tablet (100 mg total) by mouth every 8 (eight) hours. 270 tablet 3    HYDROcodone-acetaminophen (NORCO) 5-325 mg per tablet Take 1 tablet by mouth every 4 (four) hours as needed for Pain. 5 tablet 0    insulin glargine (LANTUS) 100 unit/mL injection Inject 40 Units into the skin 2 (two) times daily.       losartan (COZAAR) 100 MG tablet Take 1 tablet (100 mg total) by mouth once daily. 90 tablet 3    VENTOLIN HFA 90 mcg/actuation inhaler INHALE 2 PUFFS PO Q 6 H PRN  1     Current Facility-Administered Medications   Medication Dose Route Frequency Provider Last Rate Last Dose    perflutren protein-A microsphr 0.22 mg/mL IV susp  1 mL Intravenous Once Moni Deng MD           Review of patient's allergies indicates:   Allergen Reactions    Strawberries [strawberry] Hives and Itching    Codeine Itching    Tramadol Hives       Past Medical History:   Diagnosis Date    Anemia     Chronic kidney disease-mineral and bone disorder     CKD (chronic kidney disease) stage 4, GFR 15-29 ml/min     CKD (chronic kidney disease) stage 5, GFR less than 15 ml/min     Diabetes mellitus     Diabetic nephropathy     Disorder of kidney and ureter     Edema     Hypertension     Hyperuricemia     Hypoalbuminemia     Hypocalcemia     Nephrotic syndrome     Protein malnutrition     Proteinuria     Secondary hyperparathyroidism     Secondary hyperparathyroidism of renal origin     Vitamin D deficiency        Past Surgical History:   Procedure Laterality Date    APPENDECTOMY      [id number] N/A 10/28/2015    Performed by [facility]     SECTION  1989    HYSTERECTOMY      INSERTION, CATHETER, DIALYSIS, PERITONEAL, LAPAROSCOPIC N/A 10/23/2018    Performed by [facility] at [facility] OR    PD cath placement         Family History   Problem Relation Age of Onset    Hypertension Mother     Diabetes Mother     Heart disease Mother      Hypertension Father     Diabetes Father     Liver disease Father        Social History     Socioeconomic History    Marital status: Single     Spouse name: Not on file    Number of children: Not on file    Years of education: Not on file    Highest education level: Not on file   Occupational History    Not on file   Social Needs    Financial resource strain: Not on file    Food insecurity:     Worry: Not on file     Inability: Not on file    Transportation needs:     Medical: Not on file     Non-medical: Not on file   Tobacco Use    Smoking status: Never Smoker    Smokeless tobacco: Never Used   Substance and Sexual Activity    Alcohol use: No    Drug use: No    Sexual activity: Not Currently   Lifestyle    Physical activity:     Days per week: Not on file     Minutes per session: Not on file    Stress: Not on file   Relationships    Social connections:     Talks on phone: Not on file     Gets together: Not on file     Attends Yarsanism service: Not on file     Active member of club or organization: Not on file     Attends meetings of clubs or organizations: Not on file     Relationship status: Not on file   Other Topics Concern    Not on file   Social History Narrative    Not on file       Vitals:    07/29/19 1159   BP: (!) 220/100   Pulse:    Temp:        Physical Exam   Constitutional: She is oriented to person, place, and time. She appears well-developed and well-nourished. No distress.   HENT:   Head: Normocephalic and atraumatic.   Eyes: No scleral icterus.   Cardiovascular: Normal rate.   Pulmonary/Chest: Effort normal. No stridor.   Abdominal: Soft. She exhibits no distension. There is no tenderness.   LLQ palpable catheter   Lymphadenopathy:     She has no cervical adenopathy.   Neurological: She is alert and oriented to person, place, and time.   Skin: Skin is warm. No erythema.   Psychiatric: She has a normal mood and affect. Her behavior is normal.     GFR 9    Assessment & Plan:    64F with CKD 5 here for PD externalization  Will externalize in office  Has not yet called to set up appointment with PD nurse  Ok to use

## 2019-07-29 NOTE — H&P (VIEW-ONLY)
Patient ID: Belinda Kelly is a 64 y.o. female.    Chief Complaint: No chief complaint on file.      HPI:  64F with CKD here for downtrending GFR. Discussed with nephro and plan to start PD after embedded PD catheter placed by me 8-9 months ago. Has been doing well. Feels well today. Still makes urine. Htn not well controlled, denies HA, CP, SOB. Took BP meds this morning. Here for externalization of PD catheter.       Review of Systems   Constitutional: Negative for fever.   HENT: Negative for trouble swallowing.    Respiratory: Negative for shortness of breath.    Cardiovascular: Negative for chest pain.   Gastrointestinal: Negative for abdominal pain, blood in stool, nausea and vomiting.   Genitourinary: Negative for dysuria.   Musculoskeletal: Negative for gait problem.   Skin: Negative for rash and wound.   Allergic/Immunologic: Negative for immunocompromised state.   Neurological: Negative for weakness.   Hematological: Does not bruise/bleed easily.   Psychiatric/Behavioral: Negative for agitation.       Current Outpatient Medications   Medication Sig Dispense Refill    acetaminophen (TYLENOL) 325 MG tablet Take 325 mg by mouth every 6 (six) hours as needed for Pain.      allopurinol (ZYLOPRIM) 100 MG tablet Take 100 mg by mouth once daily.      aspirin (ECOTRIN) 81 MG EC tablet Take 81 mg by mouth once daily.      calcium-vitamin D3 (OS-SHARON 500 + D3) 500 mg(1,250mg) -200 unit per tablet Take 1 tablet by mouth once daily.      carvedilol (COREG) 25 MG tablet Take 1 tablet (25 mg total) by mouth 2 (two) times daily. 180 tablet 3    cetirizine (ZYRTEC) 10 MG tablet TK 1 T PO QD  0    cloNIDine (CATAPRES) 0.3 MG tablet Take 1 tablet (0.3 mg total) by mouth 3 (three) times daily. (Patient taking differently: Take 0.1 mg by mouth 2 (two) times daily. ) 270 tablet 3    furosemide (LASIX) 80 MG tablet Take 0.5 tablets (40 mg total) by mouth once daily. 90 tablet 3    hydrALAZINE (APRESOLINE) 100 MG tablet  Take 1 tablet (100 mg total) by mouth every 8 (eight) hours. 270 tablet 3    HYDROcodone-acetaminophen (NORCO) 5-325 mg per tablet Take 1 tablet by mouth every 4 (four) hours as needed for Pain. 5 tablet 0    insulin glargine (LANTUS) 100 unit/mL injection Inject 40 Units into the skin 2 (two) times daily.       losartan (COZAAR) 100 MG tablet Take 1 tablet (100 mg total) by mouth once daily. 90 tablet 3    VENTOLIN HFA 90 mcg/actuation inhaler INHALE 2 PUFFS PO Q 6 H PRN  1     Current Facility-Administered Medications   Medication Dose Route Frequency Provider Last Rate Last Dose    perflutren protein-A microsphr 0.22 mg/mL IV susp  1 mL Intravenous Once Moni Deng MD           Review of patient's allergies indicates:   Allergen Reactions    Strawberries [strawberry] Hives and Itching    Codeine Itching    Tramadol Hives       Past Medical History:   Diagnosis Date    Anemia     Chronic kidney disease-mineral and bone disorder     CKD (chronic kidney disease) stage 4, GFR 15-29 ml/min     CKD (chronic kidney disease) stage 5, GFR less than 15 ml/min     Diabetes mellitus     Diabetic nephropathy     Disorder of kidney and ureter     Edema     Hypertension     Hyperuricemia     Hypoalbuminemia     Hypocalcemia     Nephrotic syndrome     Protein malnutrition     Proteinuria     Secondary hyperparathyroidism     Secondary hyperparathyroidism of renal origin     Vitamin D deficiency        Past Surgical History:   Procedure Laterality Date    APPENDECTOMY      [id number] N/A 10/28/2015    Performed by [facility]     SECTION  1989    HYSTERECTOMY      INSERTION, CATHETER, DIALYSIS, PERITONEAL, LAPAROSCOPIC N/A 10/23/2018    Performed by Fransico Velasquez MD at [facility]    PD cath placement         Family History   Problem Relation Age of Onset    Hypertension Mother     Diabetes Mother     Heart disease Mother      Hypertension Father     Diabetes Father     Liver disease Father        Social History     Socioeconomic History    Marital status: Single     Spouse name: Not on file    Number of children: Not on file    Years of education: Not on file    Highest education level: Not on file   Occupational History    Not on file   Social Needs    Financial resource strain: Not on file    Food insecurity:     Worry: Not on file     Inability: Not on file    Transportation needs:     Medical: Not on file     Non-medical: Not on file   Tobacco Use    Smoking status: Never Smoker    Smokeless tobacco: Never Used   Substance and Sexual Activity    Alcohol use: No    Drug use: No    Sexual activity: Not Currently   Lifestyle    Physical activity:     Days per week: Not on file     Minutes per session: Not on file    Stress: Not on file   Relationships    Social connections:     Talks on phone: Not on file     Gets together: Not on file     Attends Yazidism service: Not on file     Active member of club or organization: Not on file     Attends meetings of clubs or organizations: Not on file     Relationship status: Not on file   Other Topics Concern    Not on file   Social History Narrative    Not on file       Vitals:    07/29/19 1159   BP: (!) 220/100   Pulse:    Temp:        Physical Exam   Constitutional: She is oriented to person, place, and time. She appears well-developed and well-nourished. No distress.   HENT:   Head: Normocephalic and atraumatic.   Eyes: No scleral icterus.   Cardiovascular: Normal rate.   Pulmonary/Chest: Effort normal. No stridor.   Abdominal: Soft. She exhibits no distension. There is no tenderness.   LLQ palpable catheter   Lymphadenopathy:     She has no cervical adenopathy.   Neurological: She is alert and oriented to person, place, and time.   Skin: Skin is warm. No erythema.   Psychiatric: She has a normal mood and affect. Her behavior is normal.     GFR 9    Assessment & Plan:    64F with CKD 5 here for PD externalization  Will externalize in office  Has not yet called to set up appointment with PD nurse  Ok to use

## 2019-07-29 NOTE — PROGRESS NOTES
"Belinda Kelly is a 64 y.o. female patient.    Temp: 97.1 °F (36.2 °C) (07/29/19 1122)  Pulse: 84 (07/29/19 1122)  BP: (!) 220/100 (07/29/19 1159)  Weight: 106.2 kg (234 lb 2.1 oz) (07/29/19 1122)  Height: 5' 4" (162.6 cm) (07/29/19 1122)       Externalization of PD catheter tip  Date/Time: 7/29/2019 2:50 PM  Performed by: Fransico Velasquez MD  Authorized by: Fransico Velasquez MD     Consent Done?:  Yes (Written)  Time out: Immediately prior to procedure a "time out" was called to verify the correct patient, procedure, equipment, support staff and site/side marked as required.      STAFF:  I was present for the entire procedure.  INDICATIONS:: CKD5.  LOCATION:left    PREP:Position:  Supine    ANESTHESIA:  Anesthesia:  Local infiltration  Local anesthetic:  Lidocaine 1% with epinephrine    PROCEDURE DETAILS:  Scalpel size:  15  Incision type:  Single straight   LUQ skin incision made over caheter. Tip delivered through this incision. There appeared to be a small hole in the catheter about 2-3cm from the skin exit site. Unsure if this was made at time of placement or during externalization. Had to cut the catheter short and place cap. Able to flush fairly easily but seems slow to draw back. Dressing placed. Can place extender if works as is. Flushed again with heparin.        Fransico Velasquez  7/29/2019  "

## 2019-08-02 ENCOUNTER — TELEPHONE (OUTPATIENT)
Dept: SURGERY | Facility: CLINIC | Age: 64
End: 2019-08-02

## 2019-08-02 DIAGNOSIS — N18.5 STAGE 5 CHRONIC KIDNEY DISEASE NOT ON CHRONIC DIALYSIS: Primary | ICD-10-CM

## 2019-08-02 DIAGNOSIS — N18.6 ESRD (END STAGE RENAL DISEASE): ICD-10-CM

## 2019-08-02 NOTE — TELEPHONE ENCOUNTER
----- Message from Yessi Landeros sent at 8/2/2019  2:57 PM CDT -----  Contact: Self [phone]  Patient is calling to talk to nurse in regards to the surgery she had she feels that the tube is not working. please advice           08/02/2019        1659  Contacted patient regarding the above message. Patient stated that she talked to the doctor already and she is scheduled for surgery on 08/15/2019. Patient verbalized understanding.

## 2019-08-08 ENCOUNTER — ANESTHESIA EVENT (OUTPATIENT)
Dept: SURGERY | Facility: HOSPITAL | Age: 64
End: 2019-08-08
Payer: MEDICARE

## 2019-08-15 ENCOUNTER — ANESTHESIA (OUTPATIENT)
Dept: SURGERY | Facility: HOSPITAL | Age: 64
End: 2019-08-15
Payer: MEDICARE

## 2019-08-15 ENCOUNTER — HOSPITAL ENCOUNTER (OUTPATIENT)
Facility: HOSPITAL | Age: 64
Discharge: HOME | End: 2019-08-15
Attending: STUDENT IN AN ORGANIZED HEALTH CARE EDUCATION/TRAINING PROGRAM | Admitting: STUDENT IN AN ORGANIZED HEALTH CARE EDUCATION/TRAINING PROGRAM
Payer: MEDICARE

## 2019-08-15 VITALS
SYSTOLIC BLOOD PRESSURE: 195 MMHG | HEIGHT: 64 IN | HEART RATE: 54 BPM | RESPIRATION RATE: 16 BRPM | OXYGEN SATURATION: 98 % | TEMPERATURE: 98 F | BODY MASS INDEX: 40.12 KG/M2 | WEIGHT: 235 LBS | DIASTOLIC BLOOD PRESSURE: 86 MMHG

## 2019-08-15 DIAGNOSIS — I10 HYPERTENSION: ICD-10-CM

## 2019-08-15 DIAGNOSIS — N18.5 STAGE 5 CHRONIC KIDNEY DISEASE NOT ON CHRONIC DIALYSIS: ICD-10-CM

## 2019-08-15 DIAGNOSIS — N18.6 ESRD (END STAGE RENAL DISEASE): Primary | ICD-10-CM

## 2019-08-15 LAB
ANION GAP SERPL CALC-SCNC: 11 MMOL/L (ref 8–16)
BASOPHILS # BLD AUTO: 0.03 K/UL (ref 0–0.2)
BASOPHILS NFR BLD: 0.4 % (ref 0–1.9)
BUN SERPL-MCNC: 97 MG/DL (ref 8–23)
CALCIUM SERPL-MCNC: 9.2 MG/DL (ref 8.7–10.5)
CHLORIDE SERPL-SCNC: 105 MMOL/L (ref 95–110)
CO2 SERPL-SCNC: 22 MMOL/L (ref 23–29)
CREAT SERPL-MCNC: 6.8 MG/DL (ref 0.5–1.4)
DIFFERENTIAL METHOD: ABNORMAL
EOSINOPHIL # BLD AUTO: 0.2 K/UL (ref 0–0.5)
EOSINOPHIL NFR BLD: 2.2 % (ref 0–8)
ERYTHROCYTE [DISTWIDTH] IN BLOOD BY AUTOMATED COUNT: 14.3 % (ref 11.5–14.5)
EST. GFR  (AFRICAN AMERICAN): 7 ML/MIN/1.73 M^2
EST. GFR  (NON AFRICAN AMERICAN): 6 ML/MIN/1.73 M^2
GLUCOSE SERPL-MCNC: 186 MG/DL (ref 70–110)
HCT VFR BLD AUTO: 34.7 % (ref 37–48.5)
HGB BLD-MCNC: 11.1 G/DL (ref 12–16)
LYMPHOCYTES # BLD AUTO: 2.4 K/UL (ref 1–4.8)
LYMPHOCYTES NFR BLD: 36.6 % (ref 18–48)
MCH RBC QN AUTO: 30.1 PG (ref 27–31)
MCHC RBC AUTO-ENTMCNC: 32 G/DL (ref 32–36)
MCV RBC AUTO: 94 FL (ref 82–98)
MONOCYTES # BLD AUTO: 0.3 K/UL (ref 0.3–1)
MONOCYTES NFR BLD: 4.2 % (ref 4–15)
NEUTROPHILS # BLD AUTO: 3.8 K/UL (ref 1.8–7.7)
NEUTROPHILS NFR BLD: 56.6 % (ref 38–73)
PLATELET # BLD AUTO: 331 K/UL (ref 150–350)
PMV BLD AUTO: 11.7 FL (ref 9.2–12.9)
POCT GLUCOSE: 187 MG/DL (ref 70–110)
POCT GLUCOSE: 195 MG/DL (ref 70–110)
POTASSIUM SERPL-SCNC: 4.9 MMOL/L (ref 3.5–5.1)
RBC # BLD AUTO: 3.69 M/UL (ref 4–5.4)
SODIUM SERPL-SCNC: 138 MMOL/L (ref 136–145)
WBC # BLD AUTO: 6.67 K/UL (ref 3.9–12.7)

## 2019-08-15 PROCEDURE — 71000016 HC POSTOP RECOV ADDL HR: Performed by: STUDENT IN AN ORGANIZED HEALTH CARE EDUCATION/TRAINING PROGRAM

## 2019-08-15 PROCEDURE — C1750 CATH, HEMODIALYSIS,LONG-TERM: HCPCS | Performed by: STUDENT IN AN ORGANIZED HEALTH CARE EDUCATION/TRAINING PROGRAM

## 2019-08-15 PROCEDURE — 71000033 HC RECOVERY, INTIAL HOUR: Performed by: STUDENT IN AN ORGANIZED HEALTH CARE EDUCATION/TRAINING PROGRAM

## 2019-08-15 PROCEDURE — 71000015 HC POSTOP RECOV 1ST HR: Performed by: STUDENT IN AN ORGANIZED HEALTH CARE EDUCATION/TRAINING PROGRAM

## 2019-08-15 PROCEDURE — 85025 COMPLETE CBC W/AUTO DIFF WBC: CPT

## 2019-08-15 PROCEDURE — 80048 BASIC METABOLIC PNL TOTAL CA: CPT

## 2019-08-15 PROCEDURE — 49325 LAP REVISION PERM IP CATH: CPT | Mod: ,,, | Performed by: STUDENT IN AN ORGANIZED HEALTH CARE EDUCATION/TRAINING PROGRAM

## 2019-08-15 PROCEDURE — 63600175 PHARM REV CODE 636 W HCPCS: Performed by: STUDENT IN AN ORGANIZED HEALTH CARE EDUCATION/TRAINING PROGRAM

## 2019-08-15 PROCEDURE — 37000008 HC ANESTHESIA 1ST 15 MINUTES: Performed by: STUDENT IN AN ORGANIZED HEALTH CARE EDUCATION/TRAINING PROGRAM

## 2019-08-15 PROCEDURE — 49325 PR LAP REVISE INTRAPERITONEAL CATHETER: ICD-10-PCS | Mod: ,,, | Performed by: STUDENT IN AN ORGANIZED HEALTH CARE EDUCATION/TRAINING PROGRAM

## 2019-08-15 PROCEDURE — 63600175 PHARM REV CODE 636 W HCPCS: Performed by: ANESTHESIOLOGY

## 2019-08-15 PROCEDURE — 63600175 PHARM REV CODE 636 W HCPCS

## 2019-08-15 PROCEDURE — 36000709 HC OR TIME LEV III EA ADD 15 MIN: Performed by: STUDENT IN AN ORGANIZED HEALTH CARE EDUCATION/TRAINING PROGRAM

## 2019-08-15 PROCEDURE — 25000003 PHARM REV CODE 250: Performed by: NURSE ANESTHETIST, CERTIFIED REGISTERED

## 2019-08-15 PROCEDURE — 36000708 HC OR TIME LEV III 1ST 15 MIN: Performed by: STUDENT IN AN ORGANIZED HEALTH CARE EDUCATION/TRAINING PROGRAM

## 2019-08-15 PROCEDURE — 63600175 PHARM REV CODE 636 W HCPCS: Performed by: NURSE ANESTHETIST, CERTIFIED REGISTERED

## 2019-08-15 PROCEDURE — 25000003 PHARM REV CODE 250: Performed by: STUDENT IN AN ORGANIZED HEALTH CARE EDUCATION/TRAINING PROGRAM

## 2019-08-15 PROCEDURE — 37000009 HC ANESTHESIA EA ADD 15 MINS: Performed by: STUDENT IN AN ORGANIZED HEALTH CARE EDUCATION/TRAINING PROGRAM

## 2019-08-15 PROCEDURE — 36415 COLL VENOUS BLD VENIPUNCTURE: CPT

## 2019-08-15 PROCEDURE — 71000039 HC RECOVERY, EACH ADD'L HOUR: Performed by: STUDENT IN AN ORGANIZED HEALTH CARE EDUCATION/TRAINING PROGRAM

## 2019-08-15 DEVICE — KIT PERITONEAL DIALYS 62.0CM: Type: IMPLANTABLE DEVICE | Site: ABDOMEN | Status: FUNCTIONAL

## 2019-08-15 RX ORDER — BUPIVACAINE HYDROCHLORIDE 2.5 MG/ML
INJECTION, SOLUTION EPIDURAL; INFILTRATION; INTRACAUDAL
Status: DISCONTINUED | OUTPATIENT
Start: 2019-08-15 | End: 2019-08-15 | Stop reason: HOSPADM

## 2019-08-15 RX ORDER — HYDROCODONE BITARTRATE AND ACETAMINOPHEN 5; 325 MG/1; MG/1
1 TABLET ORAL EVERY 4 HOURS PRN
Status: DISCONTINUED | OUTPATIENT
Start: 2019-08-15 | End: 2019-08-15 | Stop reason: HOSPADM

## 2019-08-15 RX ORDER — DIPHENHYDRAMINE HYDROCHLORIDE 50 MG/ML
25 INJECTION INTRAMUSCULAR; INTRAVENOUS EVERY 6 HOURS PRN
Status: DISCONTINUED | OUTPATIENT
Start: 2019-08-15 | End: 2019-08-15 | Stop reason: HOSPADM

## 2019-08-15 RX ORDER — PROPOFOL 10 MG/ML
VIAL (ML) INTRAVENOUS
Status: DISCONTINUED | OUTPATIENT
Start: 2019-08-15 | End: 2019-08-15

## 2019-08-15 RX ORDER — PHENYLEPHRINE HYDROCHLORIDE 10 MG/ML
INJECTION INTRAVENOUS
Status: DISCONTINUED | OUTPATIENT
Start: 2019-08-15 | End: 2019-08-15

## 2019-08-15 RX ORDER — OXYCODONE AND ACETAMINOPHEN 5; 325 MG/1; MG/1
1 TABLET ORAL
Status: DISCONTINUED | OUTPATIENT
Start: 2019-08-15 | End: 2019-08-15 | Stop reason: HOSPADM

## 2019-08-15 RX ORDER — SUCCINYLCHOLINE CHLORIDE 20 MG/ML
INJECTION INTRAMUSCULAR; INTRAVENOUS
Status: DISCONTINUED | OUTPATIENT
Start: 2019-08-15 | End: 2019-08-15

## 2019-08-15 RX ORDER — HEPARIN SODIUM 10000 [USP'U]/ML
INJECTION, SOLUTION INTRAVENOUS; SUBCUTANEOUS
Status: DISCONTINUED | OUTPATIENT
Start: 2019-08-15 | End: 2019-08-15 | Stop reason: HOSPADM

## 2019-08-15 RX ORDER — CLONIDINE HYDROCHLORIDE 0.1 MG/1
0.3 TABLET ORAL ONCE
Status: COMPLETED | OUTPATIENT
Start: 2019-08-15 | End: 2019-08-15

## 2019-08-15 RX ORDER — EPHEDRINE SULFATE 50 MG/ML
INJECTION, SOLUTION INTRAVENOUS
Status: DISCONTINUED | OUTPATIENT
Start: 2019-08-15 | End: 2019-08-15

## 2019-08-15 RX ORDER — AMOXICILLIN 250 MG
1 CAPSULE ORAL 2 TIMES DAILY
COMMUNITY
Start: 2019-08-15 | End: 2020-09-01

## 2019-08-15 RX ORDER — FENTANYL CITRATE 50 UG/ML
INJECTION, SOLUTION INTRAMUSCULAR; INTRAVENOUS
Status: DISCONTINUED | OUTPATIENT
Start: 2019-08-15 | End: 2019-08-15

## 2019-08-15 RX ORDER — CISATRACURIUM BESYLATE 2 MG/ML
INJECTION, SOLUTION INTRAVENOUS
Status: DISCONTINUED | OUTPATIENT
Start: 2019-08-15 | End: 2019-08-15

## 2019-08-15 RX ORDER — HYDRALAZINE HYDROCHLORIDE 25 MG/1
100 TABLET, FILM COATED ORAL ONCE
Status: COMPLETED | OUTPATIENT
Start: 2019-08-15 | End: 2019-08-15

## 2019-08-15 RX ORDER — HYDROMORPHONE HYDROCHLORIDE 2 MG/ML
0.2 INJECTION, SOLUTION INTRAMUSCULAR; INTRAVENOUS; SUBCUTANEOUS EVERY 5 MIN PRN
Status: DISCONTINUED | OUTPATIENT
Start: 2019-08-15 | End: 2019-08-15 | Stop reason: HOSPADM

## 2019-08-15 RX ORDER — ONDANSETRON 2 MG/ML
INJECTION INTRAMUSCULAR; INTRAVENOUS
Status: COMPLETED
Start: 2019-08-15 | End: 2019-08-15

## 2019-08-15 RX ORDER — LIDOCAINE HCL/PF 100 MG/5ML
SYRINGE (ML) INTRAVENOUS
Status: DISCONTINUED | OUTPATIENT
Start: 2019-08-15 | End: 2019-08-15

## 2019-08-15 RX ORDER — SODIUM CHLORIDE 9 MG/ML
INJECTION, SOLUTION INTRAVENOUS CONTINUOUS PRN
Status: DISCONTINUED | OUTPATIENT
Start: 2019-08-15 | End: 2019-08-15

## 2019-08-15 RX ORDER — CEFAZOLIN SODIUM 2 G/50ML
2 SOLUTION INTRAVENOUS
Status: DISCONTINUED | OUTPATIENT
Start: 2019-08-15 | End: 2019-08-15 | Stop reason: HOSPADM

## 2019-08-15 RX ORDER — HYDROMORPHONE HYDROCHLORIDE 2 MG/ML
0.5 INJECTION, SOLUTION INTRAMUSCULAR; INTRAVENOUS; SUBCUTANEOUS EVERY 5 MIN PRN
Status: DISCONTINUED | OUTPATIENT
Start: 2019-08-15 | End: 2019-08-15 | Stop reason: HOSPADM

## 2019-08-15 RX ORDER — ONDANSETRON HYDROCHLORIDE 2 MG/ML
INJECTION, SOLUTION INTRAMUSCULAR; INTRAVENOUS
Status: DISCONTINUED | OUTPATIENT
Start: 2019-08-15 | End: 2019-08-15

## 2019-08-15 RX ORDER — GLYCOPYRROLATE 0.2 MG/ML
INJECTION INTRAMUSCULAR; INTRAVENOUS
Status: DISCONTINUED | OUTPATIENT
Start: 2019-08-15 | End: 2019-08-15

## 2019-08-15 RX ORDER — NEOSTIGMINE METHYLSULFATE 1 MG/ML
INJECTION, SOLUTION INTRAVENOUS
Status: DISCONTINUED | OUTPATIENT
Start: 2019-08-15 | End: 2019-08-15

## 2019-08-15 RX ORDER — ONDANSETRON 2 MG/ML
4 INJECTION INTRAMUSCULAR; INTRAVENOUS ONCE
Status: DISCONTINUED | OUTPATIENT
Start: 2019-08-15 | End: 2019-08-15 | Stop reason: HOSPADM

## 2019-08-15 RX ORDER — HYDROCODONE BITARTRATE AND ACETAMINOPHEN 5; 325 MG/1; MG/1
1 TABLET ORAL EVERY 4 HOURS PRN
Qty: 20 TABLET | Refills: 0 | Status: SHIPPED | OUTPATIENT
Start: 2019-08-15 | End: 2020-09-01

## 2019-08-15 RX ORDER — MIDAZOLAM HYDROCHLORIDE 1 MG/ML
INJECTION INTRAMUSCULAR; INTRAVENOUS
Status: DISCONTINUED | OUTPATIENT
Start: 2019-08-15 | End: 2019-08-15

## 2019-08-15 RX ORDER — LIDOCAINE HYDROCHLORIDE 10 MG/ML
INJECTION INFILTRATION; PERINEURAL
Status: DISCONTINUED | OUTPATIENT
Start: 2019-08-15 | End: 2019-08-15 | Stop reason: HOSPADM

## 2019-08-15 RX ADMIN — DEXTROSE 3 G: 50 INJECTION, SOLUTION INTRAVENOUS at 10:08

## 2019-08-15 RX ADMIN — MIDAZOLAM HYDROCHLORIDE 2 MG: 1 INJECTION, SOLUTION INTRAMUSCULAR; INTRAVENOUS at 10:08

## 2019-08-15 RX ADMIN — HYDROMORPHONE HYDROCHLORIDE 0.5 MG: 2 INJECTION INTRAMUSCULAR; INTRAVENOUS; SUBCUTANEOUS at 02:08

## 2019-08-15 RX ADMIN — EPHEDRINE SULFATE 10 MG: 50 INJECTION, SOLUTION INTRAMUSCULAR; INTRAVENOUS; SUBCUTANEOUS at 10:08

## 2019-08-15 RX ADMIN — PHENYLEPHRINE HYDROCHLORIDE 100 MG: 10 INJECTION INTRAVENOUS at 10:08

## 2019-08-15 RX ADMIN — EPHEDRINE SULFATE 5 MG: 50 INJECTION, SOLUTION INTRAMUSCULAR; INTRAVENOUS; SUBCUTANEOUS at 12:08

## 2019-08-15 RX ADMIN — CLONIDINE HYDROCHLORIDE 0.3 MG: 0.1 TABLET ORAL at 03:08

## 2019-08-15 RX ADMIN — NEOSTIGMINE METHYLSULFATE 4 MG: 1 INJECTION INTRAVENOUS at 01:08

## 2019-08-15 RX ADMIN — PROMETHAZINE HYDROCHLORIDE 6.25 MG: 25 INJECTION INTRAMUSCULAR; INTRAVENOUS at 04:08

## 2019-08-15 RX ADMIN — ONDANSETRON 4 MG: 2 INJECTION INTRAMUSCULAR; INTRAVENOUS at 03:08

## 2019-08-15 RX ADMIN — CISATRACURIUM BESYLATE 6 MG: 2 INJECTION INTRAVENOUS at 10:08

## 2019-08-15 RX ADMIN — EPHEDRINE SULFATE 5 MG: 50 INJECTION, SOLUTION INTRAMUSCULAR; INTRAVENOUS; SUBCUTANEOUS at 10:08

## 2019-08-15 RX ADMIN — SUCCINYLCHOLINE CHLORIDE 120 MG: 20 INJECTION, SOLUTION INTRAMUSCULAR; INTRAVENOUS at 10:08

## 2019-08-15 RX ADMIN — SODIUM CHLORIDE: 0.9 INJECTION, SOLUTION INTRAVENOUS at 09:08

## 2019-08-15 RX ADMIN — LIDOCAINE HYDROCHLORIDE 50 MG: 20 INJECTION, SOLUTION INTRAVENOUS at 10:08

## 2019-08-15 RX ADMIN — HYDROCODONE BITARTRATE AND ACETAMINOPHEN 1 TABLET: 5; 325 TABLET ORAL at 01:08

## 2019-08-15 RX ADMIN — GLYCOPYRROLATE 0.6 MG: 0.2 INJECTION, SOLUTION INTRAMUSCULAR; INTRAVENOUS at 01:08

## 2019-08-15 RX ADMIN — FENTANYL CITRATE 100 MCG: 50 INJECTION, SOLUTION INTRAMUSCULAR; INTRAVENOUS at 10:08

## 2019-08-15 RX ADMIN — ONDANSETRON 8 MG: 2 INJECTION, SOLUTION INTRAMUSCULAR; INTRAVENOUS at 11:08

## 2019-08-15 RX ADMIN — PROPOFOL 100 MG: 10 INJECTION, EMULSION INTRAVENOUS at 10:08

## 2019-08-15 RX ADMIN — CISATRACURIUM BESYLATE 4 MG: 2 INJECTION INTRAVENOUS at 10:08

## 2019-08-15 RX ADMIN — HYDRALAZINE HYDROCHLORIDE 100 MG: 25 TABLET, FILM COATED ORAL at 03:08

## 2019-08-15 NOTE — OP NOTE
DATE OF PROCEDURE: 08/15/2019    PREOPERATIVE DIAGNOSIS: CKD V    POSTOPERATIVE DIAGNOSIS: CKD V    PROCEDURE:   Removal of peritoneal dialysis catheter  Laparoscopic lysis of adhesions  Laparoscopic placement of peritoneal dialysis catheter.    SURGEON: Fransico Velasquez M.D.    ASSISTANT: Dennis Ashton PGY2    ANESTHESIA: General endotracheal prep chlorhexidine.    ESTIMATED BLOOD LOSS: Minimal.    SPECIMENS: None.    INDICATIONS: The patient is an 64 y.o. female, who has CKD V and is planning to initiate peritoneal dialysis. She had a PD catheter placed last year. It was externalized in the office but was never able to be used because it would not draw back fluid.  The risks of surgery were discussed with   the patient including bleeding, infection, pain, scar, wound complications,   injury to local structures, wanting more extensive surgery, and potential need   for further surgery. The patient demonstrated understanding of these risks and   a consent form was obtained.    PROCEDURE: The patient was identified in the Preoperative Unit and taken back    to the Operating Room, laid supine on the operating room table. IV antibiotics    were administered prior to the induction of general anesthesia. General    anesthesia was induced without complication. The patient was then prepped and    draped in a standard sterile fashion. Local anesthesia was infiltrated into the  skin and subcutaneous tissues to all the incision site.   A 5 mm incision was    made in the Left upper quadrant with an #11 blade scalpel. visiport entrance was    Performed without injury.  There was a significant amount of intra-abdominal adhesions noted.  Two additional 5mm trocars were placed in the LLQ. Significant time was spent freeing up the loops of bowel that were attached to the catheter. We were able to mostly free up the bowel in the pelvis and inspect the catheter. It was clotted and we were unable to get it to flow well even after being  freed from adhesions. A wire was then passed through the catheter and it was removed. The sheath was then advanced over the wire. The new catheter was advanced into the pelvis and the sheath was removed. The cuff was placed at the level of the rectus sheath. This was secured with 0 vicryl in a pursestring fashion. The catheter was tunneled to the LUQ using a roni wick tunneler. Upon abdominal inspection it was noted that the tunneler had passed through the fascia into the abdomen. The catheter had been tunneled through the mesentery of a loop of bowel. This was closely inspected and no leakage of bowel contents or bleeding was noted. The catheter was retracted and retunneled. It was confirmed to be in the subQ. The catheter was then tunneled inferiorly and to the left.   The catheter was flushed and aspirated and then    positioned to gravity and good back flow was appreciated. Heparin was used to lock the catheter. The skin incisions were  closed using 4-0 Monocryl suture in interrupted fashion. Dermabond was applied  and sterile dressing was applied to the catheter. The patient was awakened    from general anesthesia without complication and returned to the Postoperative    Recovery Unit in stable condition. At the end of the case, sponge, instrument    and needle counts were correct on 2 occasions. I was present and scrubbed    throughout the entirety of the case.        COMPLICATIONS: None.    CONDITION: Stable.    IMPLANTS: Peritoneal dialysis catheter.

## 2019-08-15 NOTE — PLAN OF CARE
Criteria met for discharge, IV removed, instructions explained,copy given. Denies pain, tolerating po well, voiding without difficulty. Nausea has resolved, no further emesis noted. Pt and family all verbalize understanding of instructions, have no further questions.Discharged home with family in good condition.

## 2019-08-15 NOTE — ANESTHESIA POSTPROCEDURE EVALUATION
Anesthesia Post Evaluation    Patient: Belinda Kelly    Procedure(s) Performed: Procedure(s) (LRB):  REVISION OF PROCEDURE INVOLVING PERITONEAL DIALYSIS CATHETER, LAPAROSCOPIC (N/A)    Final Anesthesia Type: general  Patient location during evaluation: PACU  Patient participation: Yes- Able to Participate  Level of consciousness: awake and alert  Post-procedure vital signs: reviewed and stable  Pain management: adequate  Airway patency: patent  PONV status at discharge: No PONV  Anesthetic complications: no      Cardiovascular status: blood pressure returned to baseline  Respiratory status: unassisted  Hydration status: euvolemic  Follow-up not needed.          Vitals Value Taken Time   /88 8/15/2019  3:05 PM   Temp 36.1 °C (97 °F) 8/15/2019  3:00 PM   Pulse 52 8/15/2019  3:10 PM   Resp 11 8/15/2019  3:10 PM   SpO2 99 % 8/15/2019  3:10 PM   Vitals shown include unvalidated device data.      No case tracking events are documented in the log.      Pain/Dylan Score: Pain Rating Prior to Med Admin: 7 (8/15/2019  2:25 PM)

## 2019-08-15 NOTE — ANESTHESIA PREPROCEDURE EVALUATION
08/15/2019  Belinda Kelly is a 64 y.o., female  With LUIZA, CKD, here for peritoneal dialysis catheter externalization under GETA    Past Surgical History:   Procedure Laterality Date    APPENDECTOMY      [id number] N/A 10/28/2015    Performed by [facility] at [facility] OR     SECTION  1989    HYSTERECTOMY      INSERTION, CATHETER, DIALYSIS, PERITONEAL, LAPAROSCOPIC N/A 10/23/2018    Performed by Fransico Velasquez MD at [facility] OR    PD cath placement            Pre-op Assessment    I have reviewed the Patient Summary Reports.     I have reviewed the Nursing Notes.   I have reviewed the Medications.     Review of Systems  Anesthesia Hx:  No problems with previous Anesthesia  History of prior surgery of interest to airway management or planning: Previous anesthesia: General  Denies Personal Hx of Anesthesia complications.   Social:  Non-Smoker, No Alcohol Use    Hematology/Oncology:  Hematology Normal        EENT/Dental:   chronic allergic rhinitis   Cardiovascular:   Exercise tolerance: good Hypertension, well controlled Denies Dysrhythmias.   Denies Angina.        Pulmonary:   Asthma (has not used rescue inhaler in over 1 year) mild and asymptomatic Denies Shortness of breath. Sleep Apnea (does not use CPAP)    Renal/:   Chronic Renal Disease, ESRD  Kidney Function/Disease, Chronic Kidney Disease (CKD) , CKD Stage IV (GFR 15-29)    Hepatic/GI:  Hepatic/GI Normal    Neurological:  Neurology Normal    Endocrine:   Diabetes, poorly controlled, type 2, using insulin  Diabetes, Type 2 Diabetes , controlled by insulin. , most recent HgA1c value was 8.4 on 2018.  Parathyroid Disease, Hyperparathyroidism, Tertiary Hyperparathyroidism        Physical Exam  General:  Morbid Obesity    Airway/Jaw/Neck:  Airway Findings: Mouth Opening: Normal Tongue: Normal  General Airway  Assessment: Adult  Mallampati: III  TM Distance: Normal, at least 6 cm  Jaw/Neck Findings:  Neck ROM: Extension Decreased, Mild  Neck Findings:  Girth Increased, Short Neck     Eyes/Ears/Nose:  EYES/EARS/NOSE FINDINGS: Normal   Dental:  Dental Findings: In tact, Periodontal disease, Mild   Chest/Lungs:  Chest/Lungs Clear    Heart/Vascular:  Heart Findings: Normal Heart murmur: negative    Abdomen:  Abdomen Findings: Normal      Mental Status:  Mental Status Findings: Normal        Anesthesia Plan  Type of Anesthesia, risks & benefits discussed:  Anesthesia Type:  general, MAC  Patient's Preference:   Intra-op Monitoring Plan: standard ASA monitors  Intra-op Monitoring Plan Comments:   Post Op Pain Control Plan: per primary service following discharge from PACU  Post Op Pain Control Plan Comments:   Induction:   IV  Beta Blocker:         Informed Consent: Patient understands risks and agrees with Anesthesia plan.  Questions answered. Anesthesia consent signed with patient.  ASA Score: 3     Day of Surgery Review of History & Physical:        Anesthesia Plan Notes: Recent injection in left eye with some hematoma. Avoid N2O        Ready For Surgery From Anesthesia Perspective.

## 2019-08-15 NOTE — TRANSFER OF CARE
"Anesthesia Transfer of Care Note    Patient: Belinda Kelly    Procedure(s) Performed: Procedure(s) (LRB):  REVISION OF PROCEDURE INVOLVING PERITONEAL DIALYSIS CATHETER, LAPAROSCOPIC (N/A)    Patient location: PACU    Anesthesia Type: general    Transport from OR: Transported from OR on 6-10 L/min O2 by face mask with adequate spontaneous ventilation    Post pain: adequate analgesia    Post assessment: no apparent anesthetic complications and tolerated procedure well    Post vital signs: stable    Level of consciousness: sedated    Nausea/Vomiting: no nausea/vomiting    Complications: none    Transfer of care protocol was followed      Last vitals:   Visit Vitals  BP (!) 191/86 (BP Location: Left arm, Patient Position: Lying)   Pulse 71   Temp 36.6 °C (97.8 °F) (Skin)   Resp 18   Ht 5' 4" (1.626 m)   Wt 106.6 kg (235 lb)   LMP  (LMP Unknown)   SpO2 98%   Breastfeeding? No   BMI 40.34 kg/m²     "

## 2019-08-15 NOTE — ANESTHESIA RELEASE NOTE
"Anesthesia Release from PACU Note    Patient: Belinda Kelly    Procedure(s) Performed: Procedure(s) (LRB):  REVISION OF PROCEDURE INVOLVING PERITONEAL DIALYSIS CATHETER, LAPAROSCOPIC (N/A)    Anesthesia type: general    Post pain: Adequate analgesia    Post assessment: no apparent anesthetic complications, tolerated procedure well and no evidence of recall    Last Vitals:   Visit Vitals  BP (!) 169/71   Pulse (!) 52   Temp 36.1 °C (97 °F)   Resp 18   Ht 5' 4" (1.626 m)   Wt 106.6 kg (235 lb)   LMP  (LMP Unknown)   SpO2 98%   Breastfeeding? No   BMI 40.34 kg/m²       Post vital signs: stable    Level of consciousness: awake, alert  and oriented    Nausea/Vomiting: no nausea/no vomiting    Complications: none    Airway Patency: patent    Respiratory: unassisted    Cardiovascular: stable and blood pressure at baseline    Hydration: euvolemic  "

## 2019-08-16 NOTE — DISCHARGE SUMMARY
[facility]  Short Stay  Discharge Summary    Admit Date: 8/15/2019    Discharge Date and Time: 8/15/2019  5:20 PM      Discharge Attending Physician: Mady att. providers found     Hospital Course (synopsis of major diagnoses, care, treatment, and services provided during the course of the hospital stay): Patient brought in for elective surgery. Underwent procedure without complication and was discharged.       Final Diagnoses:    Principal Problem: ESRD (end stage renal disease)   Secondary Diagnoses:   Active Hospital Problems    Diagnosis  POA    *ESRD (end stage renal disease) [N18.6]  Yes      Resolved Hospital Problems   No resolved problems to display.       Discharged Condition: stable    Disposition: Home or Self Care    Follow up/Patient Instructions:    Medications:  Reconciled Home Medications:      Medication List      START taking these medications    senna-docusate 8.6-50 mg 8.6-50 mg per tablet  Commonly known as:  PERICOLACE  Take 1 tablet by mouth 2 (two) times daily.        CHANGE how you take these medications    cloNIDine 0.3 MG tablet  Commonly known as:  CATAPRES  Take 1 tablet (0.3 mg total) by mouth 3 (three) times daily.  What changed:    · how much to take  · when to take this     * HYDROcodone-acetaminophen 5-325 mg per tablet  Commonly known as:  NORCO  Take 1 tablet by mouth every 4 (four) hours as needed for Pain.  What changed:  Another medication with the same name was added. Make sure you understand how and when to take each.     * HYDROcodone-acetaminophen 5-325 mg per tablet  Commonly known as:  NORCO  Take 1 tablet by mouth every 4 (four) hours as needed for Pain.  What changed:  You were already taking a medication with the same name, and this prescription was added. Make sure you understand how and when to take each.         * This list has 2 medication(s) that are the same as other medications prescribed for you. Read the directions carefully, and ask your  doctor or other care provider to review them with you.            CONTINUE taking these medications    acetaminophen 325 MG tablet  Commonly known as:  TYLENOL  Take 325 mg by mouth every 6 (six) hours as needed for Pain.     allopurinol 100 MG tablet  Commonly known as:  ZYLOPRIM  Take 100 mg by mouth once daily.     aspirin 81 MG EC tablet  Commonly known as:  ECOTRIN  Take 81 mg by mouth once daily.     calcium-vitamin D3 500 mg(1,250mg) -200 unit per tablet  Commonly known as:  OS-SHARON 500 + D3  Take 1 tablet by mouth once daily.     carvedilol 25 MG tablet  Commonly known as:  COREG  Take 1 tablet (25 mg total) by mouth 2 (two) times daily.     cetirizine 10 MG tablet  Commonly known as:  ZYRTEC  TK 1 T PO QD     furosemide 80 MG tablet  Commonly known as:  LASIX  Take 0.5 tablets (40 mg total) by mouth once daily.     hydrALAZINE 100 MG tablet  Commonly known as:  APRESOLINE  Take 1 tablet (100 mg total) by mouth every 8 (eight) hours.     insulin glargine 100 unit/mL injection  Commonly known as:  LANTUS  Inject 40 Units into the skin 2 (two) times daily.     losartan 100 MG tablet  Commonly known as:  COZAAR  Take 1 tablet (100 mg total) by mouth once daily.     VENTOLIN HFA 90 mcg/actuation inhaler  Generic drug:  albuterol  INHALE 2 PUFFS PO Q 6 H PRN          Discharge Procedure Orders   Diet general     Call MD for:  temperature >100.4     Call MD for:  persistent nausea and vomiting     Call MD for:  severe uncontrolled pain     Remove dressing in 48 hours     Shower on day dressing removed (No bath)     Follow-up Information     Fransico Velasquez MD.    Specialties:  Surgery, General Surgery  Contact information:  [address]  [address]  [place] [address] [phone]

## 2019-08-28 ENCOUNTER — OFFICE VISIT (OUTPATIENT)
Dept: SURGERY | Facility: CLINIC | Age: 64
End: 2019-08-28
Payer: MEDICARE

## 2019-08-28 VITALS
DIASTOLIC BLOOD PRESSURE: 91 MMHG | HEIGHT: 64 IN | WEIGHT: 236.13 LBS | SYSTOLIC BLOOD PRESSURE: 190 MMHG | TEMPERATURE: 99 F | BODY MASS INDEX: 40.31 KG/M2 | HEART RATE: 88 BPM

## 2019-08-28 DIAGNOSIS — N18.5 STAGE 5 CHRONIC KIDNEY DISEASE NOT ON CHRONIC DIALYSIS: Primary | ICD-10-CM

## 2019-08-28 PROCEDURE — 99024 POSTOP FOLLOW-UP VISIT: CPT | Mod: POP,,, | Performed by: STUDENT IN AN ORGANIZED HEALTH CARE EDUCATION/TRAINING PROGRAM

## 2019-08-28 PROCEDURE — 99999 PR PBB SHADOW E&M-EST. PATIENT-LVL III: ICD-10-PCS | Mod: PBBFAC,,, | Performed by: STUDENT IN AN ORGANIZED HEALTH CARE EDUCATION/TRAINING PROGRAM

## 2019-08-28 PROCEDURE — 99213 OFFICE O/P EST LOW 20 MIN: CPT | Mod: PBBFAC,PO | Performed by: STUDENT IN AN ORGANIZED HEALTH CARE EDUCATION/TRAINING PROGRAM

## 2019-08-28 PROCEDURE — 99999 PR PBB SHADOW E&M-EST. PATIENT-LVL III: CPT | Mod: PBBFAC,,, | Performed by: STUDENT IN AN ORGANIZED HEALTH CARE EDUCATION/TRAINING PROGRAM

## 2019-08-28 PROCEDURE — 99024 PR POST-OP FOLLOW-UP VISIT: ICD-10-PCS | Mod: POP,,, | Performed by: STUDENT IN AN ORGANIZED HEALTH CARE EDUCATION/TRAINING PROGRAM

## 2019-09-03 ENCOUNTER — HOSPITAL ENCOUNTER (OUTPATIENT)
Dept: RADIOLOGY | Facility: HOSPITAL | Age: 64
Discharge: HOME | End: 2019-09-03
Attending: INTERNAL MEDICINE
Payer: MEDICARE

## 2019-09-03 DIAGNOSIS — N18.6 ESRD (END STAGE RENAL DISEASE): ICD-10-CM

## 2019-09-03 DIAGNOSIS — N18.6 ESRD (END STAGE RENAL DISEASE): Primary | ICD-10-CM

## 2019-09-03 PROCEDURE — 74018 RADEX ABDOMEN 1 VIEW: CPT | Mod: 26,,, | Performed by: RADIOLOGY

## 2019-09-03 PROCEDURE — 74018 RADEX ABDOMEN 1 VIEW: CPT | Mod: TC,FY

## 2019-09-03 PROCEDURE — 74018 XR ABDOMEN AP 1 VIEW: ICD-10-PCS | Mod: 26,,, | Performed by: RADIOLOGY

## 2019-09-03 NOTE — PROGRESS NOTES
Referred here for problem with port, unable to flush easily. Dr karimi able to draw back some blood  Patient complains of some constipation, resolved with miralax  Ab soft NT  Exit site with minimal rim of erythema  Tube flushed easily and rajat back easily in the office  Incisions healing well  Return to PD cath training to try again

## 2019-09-05 ENCOUNTER — TELEPHONE (OUTPATIENT)
Dept: SURGERY | Facility: CLINIC | Age: 64
End: 2019-09-05

## 2019-09-05 NOTE — TELEPHONE ENCOUNTER
----- Message from Shannon Guzmán sent at 9/5/2019  1:02 PM CDT -----  Contact: Miriam langley  Pt is having problems with her dialysis- pt needed to be see today    [facility] - [phone]  Or [phone]      9/5/19  1:18pm  Spoke to Betsey Langley regarding above message. States she is able to flush PD cath with syringe but cath will not flow to gravity. I informed her that Dr. Velasquez is out of office this week. Patient scheduled for office visit Tues 9/10/19. Verbalized understanding. Betsey stated she will call patient and inform her of appointment.

## 2019-09-10 ENCOUNTER — OFFICE VISIT (OUTPATIENT)
Dept: SURGERY | Facility: CLINIC | Age: 64
End: 2019-09-10
Payer: MEDICARE

## 2019-09-10 VITALS
HEIGHT: 64 IN | HEART RATE: 94 BPM | BODY MASS INDEX: 40.17 KG/M2 | SYSTOLIC BLOOD PRESSURE: 209 MMHG | WEIGHT: 235.31 LBS | DIASTOLIC BLOOD PRESSURE: 87 MMHG | TEMPERATURE: 100 F

## 2019-09-10 DIAGNOSIS — N18.5 STAGE 5 CHRONIC KIDNEY DISEASE NOT ON CHRONIC DIALYSIS: Primary | ICD-10-CM

## 2019-09-10 PROCEDURE — 99999 PR PBB SHADOW E&M-EST. PATIENT-LVL III: ICD-10-PCS | Mod: PBBFAC,,, | Performed by: STUDENT IN AN ORGANIZED HEALTH CARE EDUCATION/TRAINING PROGRAM

## 2019-09-10 PROCEDURE — 99999 PR PBB SHADOW E&M-EST. PATIENT-LVL III: CPT | Mod: PBBFAC,,, | Performed by: STUDENT IN AN ORGANIZED HEALTH CARE EDUCATION/TRAINING PROGRAM

## 2019-09-10 PROCEDURE — 99024 POSTOP FOLLOW-UP VISIT: CPT | Mod: POP,,, | Performed by: STUDENT IN AN ORGANIZED HEALTH CARE EDUCATION/TRAINING PROGRAM

## 2019-09-10 PROCEDURE — 99024 PR POST-OP FOLLOW-UP VISIT: ICD-10-PCS | Mod: POP,,, | Performed by: STUDENT IN AN ORGANIZED HEALTH CARE EDUCATION/TRAINING PROGRAM

## 2019-09-10 PROCEDURE — 99213 OFFICE O/P EST LOW 20 MIN: CPT | Mod: PBBFAC,PO | Performed by: STUDENT IN AN ORGANIZED HEALTH CARE EDUCATION/TRAINING PROGRAM

## 2019-09-10 NOTE — PROGRESS NOTES
Subjective:   64-year-old female s/p PD catheter placement here for post op visit. Patient has been doing well with no issues. However, she has been unable to receive peritoneal dialysis. Patient states when the dialysate is connected via gravity bag, it doesn't flow.     Objective:  Vitals:    09/10/19 1534   BP: (!) 209/87   Pulse: 94   Temp: 100.3 °F (37.9 °C)     Incisions clean, dry, and intact. PD catheter aspirates and flushes very easily. PD catheter flushes with minimal force, even with one finger push.     Assessment and Plan:  64-year-old female now with multiple PD catheter replacements/revisions with recent PD catheter replacement and reported non-functioning PD catheter. PD catheter appears to be functioning in office with very easy aspiration and flush.     -follow up with PD nurse to see if peritoneal dialysis will work now  -follow up with nephrology  -unclear as to why patient is having issues with PD catheter at this time, pending results of PD nurse visit today, will decide on future interventions to obtain access for dialysis. Since she is not needing dialysis at this time, perhaps referral to vascular surgery may be in store for fistula.     Zachary Leon, PGY2  General Surgery  [phone]

## 2019-09-18 ENCOUNTER — HISTORICAL (OUTPATIENT)
Dept: RADIOLOGY | Facility: HOSPITAL | Age: 64
End: 2019-09-18

## 2019-09-18 LAB
ABS NEUT (OLG): 5.71 X10(3)/MCL (ref 2.1–9.2)
ALBUMIN SERPL-MCNC: 2.7 GM/DL (ref 3.4–5)
BASOPHILS # BLD AUTO: 0 X10(3)/MCL (ref 0–0.2)
BASOPHILS NFR BLD AUTO: 1 %
BUN SERPL-MCNC: 71 MG/DL (ref 7–18)
CALCIUM SERPL-MCNC: 8.8 MG/DL (ref 8.5–10.1)
CHLORIDE SERPL-SCNC: 112 MMOL/L (ref 98–107)
CO2 SERPL-SCNC: 24 MMOL/L (ref 21–32)
CREAT SERPL-MCNC: 5.32 MG/DL (ref 0.55–1.02)
EOSINOPHIL # BLD AUTO: 0.2 X10(3)/MCL (ref 0–0.9)
EOSINOPHIL NFR BLD AUTO: 2 %
ERYTHROCYTE [DISTWIDTH] IN BLOOD BY AUTOMATED COUNT: 14.4 % (ref 11.5–17)
GLUCOSE SERPL-MCNC: 185 MG/DL (ref 74–106)
HCT VFR BLD AUTO: 32.4 % (ref 37–47)
HGB BLD-MCNC: 9.7 GM/DL (ref 12–16)
LYMPHOCYTES # BLD AUTO: 2.1 X10(3)/MCL (ref 0.6–4.6)
LYMPHOCYTES NFR BLD AUTO: 25 %
MCH RBC QN AUTO: 29.9 PG (ref 27–31)
MCHC RBC AUTO-ENTMCNC: 29.9 GM/DL (ref 33–36)
MCV RBC AUTO: 100 FL (ref 80–94)
MONOCYTES # BLD AUTO: 0.4 X10(3)/MCL (ref 0.1–1.3)
MONOCYTES NFR BLD AUTO: 4 %
NEUTROPHILS # BLD AUTO: 5.71 X10(3)/MCL (ref 2.1–9.2)
NEUTROPHILS NFR BLD AUTO: 67 %
PHOSPHATE SERPL-MCNC: 4.7 MG/DL (ref 2.5–4.9)
PLATELET # BLD AUTO: 378 X10(3)/MCL (ref 130–400)
PMV BLD AUTO: 12.3 FL (ref 9.4–12.4)
POTASSIUM SERPL-SCNC: 5 MMOL/L (ref 3.5–5.1)
RBC # BLD AUTO: 3.24 X10(6)/MCL (ref 4.2–5.4)
SODIUM SERPL-SCNC: 144 MMOL/L (ref 136–145)
WBC # SPEC AUTO: 8.5 X10(3)/MCL (ref 4.5–11.5)

## 2019-09-25 ENCOUNTER — HISTORICAL (OUTPATIENT)
Dept: ADMINISTRATIVE | Facility: HOSPITAL | Age: 64
End: 2019-09-25

## 2019-09-25 LAB
ALBUMIN SERPL-MCNC: 2.8 GM/DL (ref 3.4–5)
ALBUMIN/GLOB SERPL: 0.8 RATIO (ref 1.1–2)
ALP SERPL-CCNC: 116 UNIT/L (ref 38–126)
ALT SERPL-CCNC: 13 UNIT/L (ref 12–78)
AST SERPL-CCNC: 11 UNIT/L (ref 15–37)
BILIRUB SERPL-MCNC: 0.3 MG/DL (ref 0.2–1)
BILIRUBIN DIRECT+TOT PNL SERPL-MCNC: 0.1 MG/DL (ref 0–0.5)
BILIRUBIN DIRECT+TOT PNL SERPL-MCNC: 0.2 MG/DL (ref 0–0.8)
BUN SERPL-MCNC: 76 MG/DL (ref 7–18)
CALCIUM SERPL-MCNC: 8.8 MG/DL (ref 8.5–10.1)
CHLORIDE SERPL-SCNC: 112 MMOL/L (ref 98–107)
CO2 SERPL-SCNC: 24 MMOL/L (ref 21–32)
CREAT SERPL-MCNC: 5.76 MG/DL (ref 0.55–1.02)
DEPRECATED CALCIDIOL+CALCIFEROL SERPL-MC: 10.43 NG/ML (ref 30–80)
ERYTHROCYTE [DISTWIDTH] IN BLOOD BY AUTOMATED COUNT: 14.6 % (ref 11.5–17)
GLOBULIN SER-MCNC: 3.4 GM/DL (ref 2.4–3.5)
GLUCOSE SERPL-MCNC: 167 MG/DL (ref 74–106)
HCT VFR BLD AUTO: 30.9 % (ref 37–47)
HGB BLD-MCNC: 9.4 GM/DL (ref 12–16)
MAGNESIUM SERPL-MCNC: 2.1 MG/DL (ref 1.8–2.4)
MCH RBC QN AUTO: 29.8 PG (ref 27–31)
MCHC RBC AUTO-ENTMCNC: 30.4 GM/DL (ref 33–36)
MCV RBC AUTO: 98.1 FL (ref 80–94)
PHOSPHATE SERPL-MCNC: 5.4 MG/DL (ref 2.5–4.9)
PLATELET # BLD AUTO: 306 X10(3)/MCL (ref 130–400)
PMV BLD AUTO: 12.4 FL (ref 9.4–12.4)
POTASSIUM SERPL-SCNC: 5 MMOL/L (ref 3.5–5.1)
PROT SERPL-MCNC: 6.2 GM/DL (ref 6.4–8.2)
PTH-INTACT SERPL-MCNC: 545.3 PG/ML (ref 18.4–80.1)
RBC # BLD AUTO: 3.15 X10(6)/MCL (ref 4.2–5.4)
SODIUM SERPL-SCNC: 143 MMOL/L (ref 136–145)
WBC # SPEC AUTO: 6.6 X10(3)/MCL (ref 4.5–11.5)

## 2019-12-10 LAB
COLOR STL: NORMAL
CONSISTENCY STL: NORMAL
HEMOCCULT SP1 STL QL: NEGATIVE

## 2019-12-12 ENCOUNTER — HISTORICAL (OUTPATIENT)
Dept: LAB | Facility: HOSPITAL | Age: 64
End: 2019-12-12

## 2019-12-12 LAB
COLOR STL: NORMAL
CONSISTENCY STL: NORMAL

## 2020-01-06 ENCOUNTER — HISTORICAL (OUTPATIENT)
Dept: ADMINISTRATIVE | Facility: HOSPITAL | Age: 65
End: 2020-01-06

## 2020-01-06 LAB
BUN SERPL-MCNC: 52 MG/DL (ref 7–18)
CALCIUM SERPL-MCNC: 9.5 MG/DL (ref 8.5–10.1)
CHLORIDE SERPL-SCNC: 109 MMOL/L (ref 98–107)
CO2 SERPL-SCNC: 27 MMOL/L (ref 21–32)
CREAT SERPL-MCNC: 5.49 MG/DL (ref 0.55–1.02)
CREAT/UREA NIT SERPL: 9.5
GLUCOSE SERPL-MCNC: 188 MG/DL (ref 74–106)
POTASSIUM SERPL-SCNC: 4.5 MMOL/L (ref 3.5–5.1)
SODIUM SERPL-SCNC: 144 MMOL/L (ref 136–145)

## 2020-01-14 DIAGNOSIS — Z76.82 ORGAN TRANSPLANT CANDIDATE: Primary | ICD-10-CM

## 2020-01-15 ENCOUNTER — TELEPHONE (OUTPATIENT)
Dept: TRANSPLANT | Facility: CLINIC | Age: 65
End: 2020-01-15

## 2020-02-27 ENCOUNTER — TELEPHONE (OUTPATIENT)
Dept: TRANSPLANT | Facility: CLINIC | Age: 65
End: 2020-02-27

## 2020-06-12 LAB
BUN SERPL-MCNC: 49.1 MG/DL (ref 9.8–20.1)
CALCIUM SERPL-MCNC: 9.1 MG/DL (ref 8.4–10.2)
CHLORIDE SERPL-SCNC: 101 MMOL/L (ref 98–107)
CO2 SERPL-SCNC: 25 MMOL/L (ref 23–31)
CREAT SERPL-MCNC: 6.4 MG/DL (ref 0.55–1.02)
CREAT/UREA NIT SERPL: 8
ERYTHROCYTE [DISTWIDTH] IN BLOOD BY AUTOMATED COUNT: 13.9 % (ref 11.5–17)
GLUCOSE SERPL-MCNC: 148 MG/DL (ref 82–115)
HCT VFR BLD AUTO: 42 % (ref 37–47)
HGB BLD-MCNC: 13.2 GM/DL (ref 12–16)
MCH RBC QN AUTO: 31.7 PG (ref 27–31)
MCHC RBC AUTO-ENTMCNC: 31.4 GM/DL (ref 33–36)
MCV RBC AUTO: 101 FL (ref 80–94)
PLATELET # BLD AUTO: 268 X10(3)/MCL (ref 130–400)
PMV BLD AUTO: 11.6 FL (ref 9.4–12.4)
POTASSIUM SERPL-SCNC: 4 MMOL/L (ref 3.5–5.1)
RBC # BLD AUTO: 4.16 X10(6)/MCL (ref 4.2–5.4)
SODIUM SERPL-SCNC: 140 MMOL/L (ref 136–145)
WBC # SPEC AUTO: 5.6 X10(3)/MCL (ref 4.5–11.5)

## 2020-06-17 ENCOUNTER — HISTORICAL (OUTPATIENT)
Dept: ADMINISTRATIVE | Facility: HOSPITAL | Age: 65
End: 2020-06-17

## 2020-06-17 LAB
BUN SERPL-MCNC: 49.6 MG/DL (ref 9.8–20.1)
CALCIUM SERPL-MCNC: 9.2 MG/DL (ref 8.4–10.2)
CHLORIDE SERPL-SCNC: 101 MMOL/L (ref 98–107)
CO2 SERPL-SCNC: 25 MMOL/L (ref 23–31)
CREAT SERPL-MCNC: 6.95 MG/DL (ref 0.55–1.02)
CREAT/UREA NIT SERPL: 7
GLUCOSE SERPL-MCNC: 235 MG/DL (ref 82–115)
POTASSIUM SERPL-SCNC: 4 MMOL/L (ref 3.5–5.1)
SODIUM SERPL-SCNC: 139 MMOL/L (ref 136–145)

## 2020-07-07 ENCOUNTER — TELEPHONE (OUTPATIENT)
Dept: TRANSPLANT | Facility: CLINIC | Age: 65
End: 2020-07-07

## 2020-07-08 NOTE — PROGRESS NOTES
Spoke to patient to get history for upcoming appointment patient will come with someone she is aware that she have to bring a small breakfast to eat after blood is drawn she do not need a

## 2020-09-01 ENCOUNTER — TELEPHONE (OUTPATIENT)
Dept: TRANSPLANT | Facility: CLINIC | Age: 65
End: 2020-09-01

## 2020-09-01 ENCOUNTER — OFFICE VISIT (OUTPATIENT)
Dept: TRANSPLANT | Facility: CLINIC | Age: 65
End: 2020-09-01
Payer: MEDICARE

## 2020-09-01 ENCOUNTER — LAB VISIT (OUTPATIENT)
Dept: LAB | Facility: HOSPITAL | Age: 65
End: 2020-09-01
Payer: MEDICARE

## 2020-09-01 VITALS
SYSTOLIC BLOOD PRESSURE: 175 MMHG | BODY MASS INDEX: 43.57 KG/M2 | HEART RATE: 88 BPM | WEIGHT: 236.75 LBS | HEIGHT: 62 IN | DIASTOLIC BLOOD PRESSURE: 77 MMHG | RESPIRATION RATE: 16 BRPM | OXYGEN SATURATION: 98 % | TEMPERATURE: 97 F

## 2020-09-01 DIAGNOSIS — N18.6 STAGE 5 CHRONIC KIDNEY DISEASE ON CHRONIC DIALYSIS: ICD-10-CM

## 2020-09-01 DIAGNOSIS — Z76.82 ORGAN TRANSPLANT CANDIDATE: ICD-10-CM

## 2020-09-01 DIAGNOSIS — N18.4 ANEMIA OF CHRONIC RENAL FAILURE, STAGE 4 (SEVERE): ICD-10-CM

## 2020-09-01 DIAGNOSIS — Z99.2 STAGE 5 CHRONIC KIDNEY DISEASE ON CHRONIC DIALYSIS: ICD-10-CM

## 2020-09-01 DIAGNOSIS — I10 ESSENTIAL HYPERTENSION: ICD-10-CM

## 2020-09-01 DIAGNOSIS — E55.9 VITAMIN D DEFICIENCY: ICD-10-CM

## 2020-09-01 DIAGNOSIS — E11.21 DIABETIC NEPHROPATHY ASSOCIATED WITH TYPE 2 DIABETES MELLITUS: ICD-10-CM

## 2020-09-01 DIAGNOSIS — Z01.818 PRE-TRANSPLANT EVALUATION FOR KIDNEY TRANSPLANT: Primary | ICD-10-CM

## 2020-09-01 DIAGNOSIS — D63.1 ANEMIA OF CHRONIC RENAL FAILURE, STAGE 4 (SEVERE): ICD-10-CM

## 2020-09-01 DIAGNOSIS — N25.81 SECONDARY HYPERPARATHYROIDISM OF RENAL ORIGIN: ICD-10-CM

## 2020-09-01 LAB
ABO + RH BLD: NORMAL
ALBUMIN SERPL BCP-MCNC: 3.6 G/DL (ref 3.5–5.2)
ALP SERPL-CCNC: 117 U/L (ref 55–135)
ALT SERPL W/O P-5'-P-CCNC: 10 U/L (ref 10–44)
ANION GAP SERPL CALC-SCNC: 12 MMOL/L (ref 8–16)
APTT BLDCRRT: 31.8 SEC (ref 21–32)
AST SERPL-CCNC: 15 U/L (ref 10–40)
BASOPHILS # BLD AUTO: 0.05 K/UL (ref 0–0.2)
BASOPHILS NFR BLD: 0.9 % (ref 0–1.9)
BILIRUB DIRECT SERPL-MCNC: 0.2 MG/DL (ref 0.1–0.3)
BILIRUB SERPL-MCNC: 0.5 MG/DL (ref 0.1–1)
BLD GP AB SCN CELLS X3 SERPL QL: NORMAL
BUN SERPL-MCNC: 40 MG/DL (ref 8–23)
CALCIUM SERPL-MCNC: 9 MG/DL (ref 8.7–10.5)
CHLORIDE SERPL-SCNC: 102 MMOL/L (ref 95–110)
CHOLEST SERPL-MCNC: 128 MG/DL (ref 120–199)
CHOLEST/HDLC SERPL: 3.2 {RATIO} (ref 2–5)
CO2 SERPL-SCNC: 26 MMOL/L (ref 23–29)
CREAT SERPL-MCNC: 5.2 MG/DL (ref 0.5–1.4)
DIFFERENTIAL METHOD: ABNORMAL
EOSINOPHIL # BLD AUTO: 0.2 K/UL (ref 0–0.5)
EOSINOPHIL NFR BLD: 3 % (ref 0–8)
ERYTHROCYTE [DISTWIDTH] IN BLOOD BY AUTOMATED COUNT: 13.5 % (ref 11.5–14.5)
EST. GFR  (AFRICAN AMERICAN): 9.4 ML/MIN/1.73 M^2
EST. GFR  (NON AFRICAN AMERICAN): 8.1 ML/MIN/1.73 M^2
ESTIMATED AVG GLUCOSE: 197 MG/DL (ref 68–131)
GLUCOSE SERPL-MCNC: 73 MG/DL (ref 70–110)
HBA1C MFR BLD HPLC: 8.5 % (ref 4–5.6)
HBV CORE AB SERPL QL IA: POSITIVE
HBV SURFACE AG SERPL QL IA: NEGATIVE
HCT VFR BLD AUTO: 35.6 % (ref 37–48.5)
HCV AB SERPL QL IA: NEGATIVE
HDLC SERPL-MCNC: 40 MG/DL (ref 40–75)
HDLC SERPL: 31.3 % (ref 20–50)
HGB BLD-MCNC: 11.2 G/DL (ref 12–16)
HIV 1+2 AB+HIV1 P24 AG SERPL QL IA: NEGATIVE
IMM GRANULOCYTES # BLD AUTO: 0.01 K/UL (ref 0–0.04)
IMM GRANULOCYTES NFR BLD AUTO: 0.2 % (ref 0–0.5)
INR PPP: 1 (ref 0.8–1.2)
LDLC SERPL CALC-MCNC: 69.8 MG/DL (ref 63–159)
LYMPHOCYTES # BLD AUTO: 1.8 K/UL (ref 1–4.8)
LYMPHOCYTES NFR BLD: 31.6 % (ref 18–48)
MCH RBC QN AUTO: 31.2 PG (ref 27–31)
MCHC RBC AUTO-ENTMCNC: 31.5 G/DL (ref 32–36)
MCV RBC AUTO: 99 FL (ref 82–98)
MONOCYTES # BLD AUTO: 0.5 K/UL (ref 0.3–1)
MONOCYTES NFR BLD: 8.6 % (ref 4–15)
NEUTROPHILS # BLD AUTO: 3.2 K/UL (ref 1.8–7.7)
NEUTROPHILS NFR BLD: 55.7 % (ref 38–73)
NONHDLC SERPL-MCNC: 88 MG/DL
NRBC BLD-RTO: 0 /100 WBC
PHOSPHATE SERPL-MCNC: 4.8 MG/DL (ref 2.7–4.5)
PLATELET # BLD AUTO: 268 K/UL (ref 150–350)
PMV BLD AUTO: 11.3 FL (ref 9.2–12.9)
POTASSIUM SERPL-SCNC: 3.8 MMOL/L (ref 3.5–5.1)
PROT SERPL-MCNC: 7.3 G/DL (ref 6–8.4)
PROTHROMBIN TIME: 11.5 SEC (ref 9–12.5)
PTH-INTACT SERPL-MCNC: 646 PG/ML (ref 9–77)
RBC # BLD AUTO: 3.59 M/UL (ref 4–5.4)
SODIUM SERPL-SCNC: 140 MMOL/L (ref 136–145)
TRIGL SERPL-MCNC: 91 MG/DL (ref 30–150)
WBC # BLD AUTO: 5.72 K/UL (ref 3.9–12.7)

## 2020-09-01 PROCEDURE — 86787 VARICELLA-ZOSTER ANTIBODY: CPT | Mod: TXP

## 2020-09-01 PROCEDURE — 86829 HLA CLASS I/II ANTIBODY QUAL: CPT | Mod: PO,TXP

## 2020-09-01 PROCEDURE — 86828 HLA CLASS I&II ANTIBODY QUAL: CPT | Mod: 91,PO,TXP

## 2020-09-01 PROCEDURE — 86803 HEPATITIS C AB TEST: CPT | Mod: TXP

## 2020-09-01 PROCEDURE — 87340 HEPATITIS B SURFACE AG IA: CPT | Mod: TXP

## 2020-09-01 PROCEDURE — 99204 PR OFFICE/OUTPT VISIT, NEW, LEVL IV, 45-59 MIN: ICD-10-PCS | Mod: S$PBB,TXP,, | Performed by: NURSE PRACTITIONER

## 2020-09-01 PROCEDURE — 86703 HIV-1/HIV-2 1 RESULT ANTBDY: CPT | Mod: TXP

## 2020-09-01 PROCEDURE — 86850 RBC ANTIBODY SCREEN: CPT | Mod: TXP

## 2020-09-01 PROCEDURE — 99999 PR PBB SHADOW E&M-EST. PATIENT-LVL V: ICD-10-PCS | Mod: PBBFAC,TXP,, | Performed by: NURSE PRACTITIONER

## 2020-09-01 PROCEDURE — 82248 BILIRUBIN DIRECT: CPT | Mod: TXP

## 2020-09-01 PROCEDURE — 86825 HLA X-MATH NON-CYTOTOXIC: CPT | Mod: 91,PO,TXP

## 2020-09-01 PROCEDURE — 84100 ASSAY OF PHOSPHORUS: CPT | Mod: TXP

## 2020-09-01 PROCEDURE — 86704 HEP B CORE ANTIBODY TOTAL: CPT | Mod: TXP

## 2020-09-01 PROCEDURE — 86644 CMV ANTIBODY: CPT | Mod: TXP

## 2020-09-01 PROCEDURE — 99215 OFFICE O/P EST HI 40 MIN: CPT | Mod: PBBFAC,25,TXP | Performed by: NURSE PRACTITIONER

## 2020-09-01 PROCEDURE — 86833 HLA CLASS II HIGH DEFIN QUAL: CPT | Mod: PO,TXP

## 2020-09-01 PROCEDURE — 80061 LIPID PANEL: CPT | Mod: TXP

## 2020-09-01 PROCEDURE — 86825 HLA X-MATH NON-CYTOTOXIC: CPT | Mod: PO,TXP

## 2020-09-01 PROCEDURE — 86592 SYPHILIS TEST NON-TREP QUAL: CPT | Mod: TXP

## 2020-09-01 PROCEDURE — 86832 HLA CLASS I HIGH DEFIN QUAL: CPT | Mod: PO,TXP

## 2020-09-01 PROCEDURE — 81373 HLA I TYPING 1 LOCUS LR: CPT | Mod: 91,PO,TXP

## 2020-09-01 PROCEDURE — 81376 HLA II TYPING 1 LOCUS LR: CPT | Mod: 91,PO,TXP

## 2020-09-01 PROCEDURE — 86706 HEP B SURFACE ANTIBODY: CPT | Mod: TXP

## 2020-09-01 PROCEDURE — 99204 OFFICE O/P NEW MOD 45 MIN: CPT | Mod: S$PBB,TXP,, | Performed by: NURSE PRACTITIONER

## 2020-09-01 PROCEDURE — 81373 HLA I TYPING 1 LOCUS LR: CPT | Mod: PO,TXP

## 2020-09-01 PROCEDURE — 83036 HEMOGLOBIN GLYCOSYLATED A1C: CPT | Mod: TXP

## 2020-09-01 PROCEDURE — 86480 TB TEST CELL IMMUN MEASURE: CPT | Mod: TXP

## 2020-09-01 PROCEDURE — 83970 ASSAY OF PARATHORMONE: CPT | Mod: TXP

## 2020-09-01 PROCEDURE — 85025 COMPLETE CBC W/AUTO DIFF WBC: CPT | Mod: TXP

## 2020-09-01 PROCEDURE — 99999 PR PBB SHADOW E&M-EST. PATIENT-LVL V: CPT | Mod: PBBFAC,TXP,, | Performed by: NURSE PRACTITIONER

## 2020-09-01 PROCEDURE — 36415 COLL VENOUS BLD VENIPUNCTURE: CPT | Mod: TXP

## 2020-09-01 PROCEDURE — 85610 PROTHROMBIN TIME: CPT | Mod: TXP

## 2020-09-01 PROCEDURE — 81376 HLA II TYPING 1 LOCUS LR: CPT | Mod: PO,TXP

## 2020-09-01 PROCEDURE — 85730 THROMBOPLASTIN TIME PARTIAL: CPT | Mod: TXP

## 2020-09-01 PROCEDURE — 80053 COMPREHEN METABOLIC PANEL: CPT | Mod: TXP

## 2020-09-01 RX ORDER — HYDRALAZINE HYDROCHLORIDE 10 MG/1
10 TABLET, FILM COATED ORAL DAILY
COMMUNITY
End: 2020-09-01 | Stop reason: DRUGHIGH

## 2020-09-01 RX ORDER — TRAMADOL HYDROCHLORIDE 50 MG/1
50 TABLET ORAL 3 TIMES DAILY PRN
COMMUNITY
Start: 2020-08-11 | End: 2022-12-29

## 2020-09-01 RX ORDER — NIFEDIPINE 90 MG/1
90 TABLET, FILM COATED, EXTENDED RELEASE ORAL DAILY
COMMUNITY

## 2020-09-01 RX ORDER — ROPINIROLE 0.25 MG/1
0.25 TABLET, FILM COATED ORAL NIGHTLY
COMMUNITY
Start: 2020-06-08 | End: 2022-12-29

## 2020-09-01 RX ORDER — HYDRALAZINE HYDROCHLORIDE 100 MG/1
100 TABLET, FILM COATED ORAL DAILY
COMMUNITY
Start: 2019-10-03

## 2020-09-01 RX ORDER — MINOXIDIL 2.5 MG/1
2.5 TABLET ORAL DAILY
COMMUNITY

## 2020-09-01 NOTE — PATIENT INSTRUCTIONS
Counseling:  Exercise: reminded patient of the importance of regular exercise for weight management, blood sugar and blood pressure management.  I also explained exercise has been shown to improve cardiovascular health, energy level, and sleep hygiene.  Lastly, I advised her that cardiovascular complications are leading cause of death for renal transplant recipients, and regular exercise can help lower this risk.    We discussed various aspects of kidney transplantation including transplant surgery, immunosuppressive medications and the need for close follow up. We also discussed side effects of immunosuppression including weight gain, hypertension, hyperlipidemia, new-onset diabetes after transplantation, infections and malignancies, especially skin cancers and lymphomas. I also reviewed the risk of acute rejection, vascular thrombosis, recurrent disease and potential transmission of infections such as hepatitis and HIV. I informed the patient that the average time on the wait list in the [facility] is between 5 to 7 years.       Discussed weight loss due to Body mass index is 43.02 kg/m². which is over our facility requirements  Goal weight set at 215lbs to be reconsidered for possible reevaluation. I do encourage you to lose more weight than that for overall health benefits and kidney transplantation. At 215lbs that would have you with a BMI 39.32 which is close to our cut off.  Diet: Diabetes  Food is an important tool that you can use to control diabetes and stay healthy. Eating well-balanced meals in the correct amounts will help you control your blood glucose levels and prevent low blood sugar reactions. It will also help you reduce the health risks of diabetes. There is no one specific diet that is right for everyone with diabetes. But there are general guidelines to follow. A registered dietitian (RD) will create a tailored diet approach thats just right for you. He or she will also help you plan  healthy meals and snacks. If you have any questions, call your dietitian for advice.     Guidelines for success  Talk with your healthcare provider before starting a diabetes diet or weight loss program. If you haven't talked with a dietitian yet, ask your provider for a referral. The following guidelines can help you succeed:  · Select foods from the 6 food groups below. Your dietitian will help you find food choices within each group. He or she will also show you serving sizes and how many servings you can have at each meal.  ¨ Grains, beans, and starchy vegetables  ¨ Vegetables  ¨ Fruit  ¨ Milk or yogurt  ¨ Meat, poultry, fish, or tofu  ¨ Healthy fats  · Check your blood sugar levels as directed by your provider. Take any medicine as prescribed by your provider.  · Learn to read food labels and pick the right portion sizes.  · Eat only the amount of food in your meal plan. Eat about the same amount of food at regular times each day. Dont skip meals. Eat meals 4 to 5 hours apart, with snacks in between.  · Limit alcohol. It raises blood sugar levels. Drink water or calorie-free diet drinks that use safe sweeteners.  · Eat less fat to help lower your risk of heart disease. Use nonfat or low-fat dairy products and lean meats. Avoid fried foods. Use cooking oils that are unsaturated, such as olive, canola, or peanut oil.  · Talk with your dietitian about safe sugar substitutes.  · Avoid added salt. It can contribute to high blood pressure, which can cause heart disease. People with diabetes already have a risk of high blood pressure and heart disease.  · Stay at a healthy weight. If you need to lose weight, cut down on your portion sizes. But dont skip meals. Exercise is an important part of any weight management program. Talk with your provider about an exercise program thats right for you.  · For more information about the best diet plan for you, talk with a registered dietitian (RD). To find an RD in your area,  contact:  ¨ Academy of Nutrition and Dietetics www.eatright.org  ¨ The American Diabetes Association 619-651-2465 www.diabetes.org  Date Last Reviewed: 8/1/2016  © 9726-1854 [employer]. [address] [place] [address], [place] [address]. All rights reserved. This information is not intended as a substitute for professional medical care. Always follow your healthcare professional's instructions.

## 2020-09-01 NOTE — TELEPHONE ENCOUNTER
Reviewed pt transplant labs.  Notified dialysis unit dietitian of the following abnormal labs via fax and requested their most recent nutrition note on this pt.  Once this note is received it will be scanned into pt's chart.    HbA1c 8.5  Phos 4.8

## 2020-09-01 NOTE — PROGRESS NOTES
PHARM.D. PRE-TRANSPLANT NOTE:    This patient's medication therapy was evaluated as part of her pre-transplant evaluation.      The following general pharmacologic concerns were noted: aspirin may increase risk of gurjit-op bleeding; patient reports taking many medications daily-  Unclear if she has been instructed to do this or on her own accord    The following concerns for post-operative pain management were noted: takes tramadol regularly    The following pharmacologic concerns related to HCV therapy were noted: none      This patient's medication profile was reviewed for considerations for DAA Hepatitis C therapy:    [x]  No current inducers of CYP 3A4 or PGP  [x]  No amiodarone on this patient's EMR profile in the last 24 months  [x]  No past or current atrial fibrillation on this patient's EMR profile       Current Outpatient Medications   Medication Sig Dispense Refill    aspirin (ECOTRIN) 81 MG EC tablet Take 81 mg by mouth once daily.      calcium-vitamin D3 (OS-SHARON 500 + D3) 500 mg(1,250mg) -200 unit per tablet Take 1 tablet by mouth once daily.      carvedilol (COREG) 25 MG tablet Take 1 tablet (25 mg total) by mouth 2 (two) times daily. (Patient taking differently: Take 25 mg by mouth once daily. ) 180 tablet 3    cetirizine (ZYRTEC) 10 MG tablet TK 1 T PO QD  0    cloNIDine (CATAPRES) 0.3 MG tablet Take 1 tablet (0.3 mg total) by mouth 3 (three) times daily. (Patient taking differently: Take 0.1 mg by mouth every evening. ) 270 tablet 3    hydrALAZINE (APRESOLINE) 100 MG tablet Take 100 mg by mouth once daily.      insulin glargine (LANTUS) 100 unit/mL injection Inject 40 Units into the skin every evening.       losartan (COZAAR) 100 MG tablet Take 1 tablet (100 mg total) by mouth once daily. 90 tablet 3    minoxidiL (LONITEN) 2.5 MG tablet Take 2.5 mg by mouth once daily.      NIFEdipine (ADALAT CC) 90 MG TbSR Take 90 mg by mouth once daily.      traMADoL (ULTRAM) 50 mg tablet Take 50 mg by  mouth 3 (three) times daily as needed.      VENTOLIN HFA 90 mcg/actuation inhaler INHALE 2 PUFFS PO Q 6 H PRN  1    rOPINIRole (REQUIP) 0.25 MG tablet Take 0.25 mg by mouth every evening.       Current Facility-Administered Medications   Medication Dose Route Frequency Provider Last Rate Last Dose    perflutren protein-A microsphr 0.22 mg/mL IV susp  1 mL Intravenous Once Moni Deng MD             Currently Mr Kelly is responsible for preparing / administering her own medications on a daily basis.  I am available for consultation and can be contacted, as needed by the other members of the Kidney Transplant team.

## 2020-09-01 NOTE — PROGRESS NOTES
Transplant Nephrology  Kidney Transplant Recipient Evaluation    Referring Physician: Yanick Segura  Current Nephrologist: Yanick Segura    Subjective:   CC:  Initial evaluation of kidney transplant candidacy.    HPI:  Ms. Kelly is a 65 y.o. year old Black or  female who has presented to be evaluated as a potential kidney transplant recipient.  She has ESRD secondary to diabetic nephropathy.  Patient is currently started dialysis 10/2019. Initially started with PD but had two failured catheters and was converted to HD.. She has a LUE AV graft for dialysis access.     Previous Transplant: no  Previous Blood Transfusion: yes  Previous Pregnancy: 3--2 children 1 miscarriage --complications diabetes, preeclapcia  Previous neurogenic bladder/ urine incontinence still urinates without B/B incontinence  Anticoagulation/ antiplatelet therapy and reason: ASA 81  Potential Donor: no  High KDPI candidate: no due to weight  Meets center eligibility for accepting HCV+ donor offer: no    ESRD:  Dx in 2019 with CKD by PCP and was referred to General Nephrology  10/29/2015    HD MWF  Dry weight: of 230lbs and typical run is 3 hours, does report cramps in her legs mostly and request to stop dilaysis  Denies hypotension during session      DM:  Lab Results   Component Value Date    LABA1C 8.4 (H) 02/20/2018   Dx at age 28  Initially started oral medications then became pregnant and required insulin. Has been on insulin ever since  Reports range 135-140. Does at time have 300-400 if has eaten fired foods. Mostly eats baked or broiled foods on regular basis.  Reports she has retinopathy and neuropathy  Denies gastroparesis        HTN:  Dx in her early 20s  BP Readings from Last 3 Encounters:   09/01/20 (!) 175/77   09/10/19 (!) 209/87   08/28/19 (!) 190/91   takes hydralazine, nifedipine, carvedilol, minoxidil, and clonidine     Body mass index is 43.02 kg/m².      Denies MI, stroke, cancers, DVTs/PEs,  wounds/ampuations      Functional assessment:  Reports she does housework and runs her errands. Able to walk from garage to office without issue. Denies CP, SOB, LE claudications       Past Medical and Surgical History: Ms. Kelly  has a past medical history of Anemia, Chronic kidney disease-mineral and bone disorder, CKD (chronic kidney disease) stage 4, GFR 15-29 ml/min, CKD (chronic kidney disease) stage 5, GFR less than 15 ml/min, Diabetes mellitus, Diabetic nephropathy, Disorder of kidney and ureter, Edema, Encounter for blood transfusion, Hypertension, Hyperuricemia, Hypoalbuminemia, Hypocalcemia, Nephrotic syndrome, Protein malnutrition, Proteinuria, Secondary hyperparathyroidism, Secondary hyperparathyroidism of renal origin, and Vitamin D deficiency.  She has a past surgical history that includes Hysterectomy (); Appendectomy ();  section (, ); PD cath placement; Peritoneal catheter removal (N/A, 8/15/2019); and Laparoscopic lysis of adhesions (8/15/2019).    Past Social and Family History: Ms. Kelly reports that she has never smoked. She has never used smokeless tobacco. She reports that she does not drink alcohol or use drugs. Her family history includes Diabetes in her father and mother; Heart disease in her mother; Hypertension in her father and mother; Liver disease in her father.    Review of Systems   Constitutional: Positive for fatigue and unexpected weight change. Negative for appetite change, chills and fever.   HENT: Negative for trouble swallowing.    Eyes: Positive for visual disturbance.   Respiratory: Negative for cough, chest tightness, shortness of breath and wheezing.    Cardiovascular: Negative for chest pain, palpitations and leg swelling.   Gastrointestinal: Negative for abdominal pain, constipation, diarrhea and nausea.        Indigestion   Genitourinary: Negative for difficulty urinating, frequency and urgency.   Musculoskeletal: Positive for back pain.  "Negative for arthralgias and myalgias.   Skin: Negative for rash.   Neurological: Positive for weakness. Negative for dizziness, light-headedness and headaches.   Psychiatric/Behavioral: Positive for sleep disturbance. The patient is nervous/anxious.        Objective:   Blood pressure (!) 175/77, pulse 88, temperature 97 °F (36.1 °C), temperature source Oral, resp. rate 16, height 5' 2.21" (1.58 m), weight 107.4 kg (236 lb 12.4 oz), SpO2 98 %.body mass index is 43.02 kg/m².    Physical Exam  Constitutional:       General: She is not in acute distress.     Appearance: She is well-developed. She is not diaphoretic.   Cardiovascular:      Rate and Rhythm: Normal rate and regular rhythm.      Heart sounds: Normal heart sounds. No murmur. No friction rub. No gallop.    Pulmonary:      Effort: Pulmonary effort is normal. No respiratory distress.      Breath sounds: Normal breath sounds. No wheezing or rales.   Abdominal:      General: Bowel sounds are normal. There is no distension.      Palpations: Abdomen is soft.      Tenderness: There is no abdominal tenderness.      Comments: centralized abd obesity    Musculoskeletal: Normal range of motion.         General: No tenderness.   Skin:     General: Skin is warm and dry.      Findings: No rash.      Nails: There is no clubbing.            Neurological:      Mental Status: She is alert and oriented to person, place, and time.   Psychiatric:         Behavior: Behavior normal.         Labs:  Lab Results   Component Value Date    WBC 5.72 09/01/2020    HGB 11.2 (L) 09/01/2020    HCT 35.6 (L) 09/01/2020     08/15/2019    K 4.9 08/15/2019     08/15/2019    CO2 22 (L) 08/15/2019    BUN 97 (H) 08/15/2019    CREATININE 6.8 (H) 08/15/2019    EGFRNONAA 6 (A) 08/15/2019    CALCIUM 9.2 08/15/2019    PHOS 5.9 (H) 07/15/2019    ALBUMIN 2.5 (L) 07/15/2019    [id number] 10 (L) 01/23/2019    UTPCR 9.8 (H) 09/21/2015    .4 (H) 07/15/2019       Lab Results   Component " Value Date    BILIRUBINUA Negative 09/21/2015    PROTEINUA 3+ (A) 07/09/2019    NITRITE NEGATIVE 07/09/2019    RBCUA 3 09/21/2015    WBCUA 1 09/21/2015       No results found for: HLAABCTYPE    Labs were reviewed with the patient.    Assessment:     1. Pre-transplant evaluation for kidney transplant    2. Stage 5 chronic kidney disease on chronic dialysis    3. Diabetic nephropathy associated with type 2 diabetes mellitus    4. Essential hypertension    5. Anemia of chronic renal failure, stage 4 (severe)    6. Secondary hyperparathyroidism of renal origin    7. Vitamin D deficiency        Plan:   Body mass index is 43.02 kg/m².  Deferred---Made goal weight of 215 to be reconsider for re-evaluation. Did encouraged to lose more weight than initial goal weight. Patient with centralized abd obesity--discussed post transplant complications r/t abd obesity    Needs colonoscopy, MMG/PAP--not up to date      Transplant Candidacy:   Based on available information, Ms. Kelly is an unacceptable kidney transplant candidate. Body mass index is 43.02 kg/m². Encourage weight loss to meet center's criteria.   Meets center eligibility for accepting HCV+ donor offer - yes.  Patient educated on HCV+ donors. Belinda is willing to accept HCV+ donor offer - no   Patient is a candidate for KDPI > 85 kidney donor offer - no. Due to weight  Final determination of transplant candidacy will be made once workup is complete and reviewed by the selection committee.    Viv Caban NP     Counseling:  Exercise: reminded patient of the importance of regular exercise for weight management, blood sugar and blood pressure management.  I also explained exercise has been shown to improve cardiovascular health, energy level, and sleep hygiene.  Lastly, I advised her that cardiovascular complications are leading cause of death for renal transplant recipients, and regular exercise can help lower this risk.    We discussed various aspects of kidney  transplantation including transplant surgery, immunosuppressive medications and the need for close follow up. We also discussed side effects of immunosuppression including weight gain, hypertension, hyperlipidemia, new-onset diabetes after transplantation, infections and malignancies, especially skin cancers and lymphomas. I also reviewed the risk of acute rejection, vascular thrombosis, recurrent disease and potential transmission of infections such as hepatitis and HIV. I informed the patient that the average time on the wait list in the [facility] is between 5 to 7 years.     UNOS Patient Status  Functional Status: 60% - Requires occasional assistance but is able to care for needs  Physical Capacity: No Limitations

## 2020-09-01 NOTE — PROGRESS NOTES
INITIAL PATIENT EDUCATION NOTE    Ms. Belinda Kelly was seen in pre-kidney transplant clinic for evaluation for kidney, kidney/pancreas or pancreas only transplant.  The patient attended a an individual video education session that discussed/reviewed the following aspects of transplantation: evaluation and selection committee process, UNOS waitlist management/multiple listings, types of organs offered (KDPI < 85%, KDPI > 85%, PHS increased risk, DCD, HCV+, HIV+ for HIV+ recipients and enbloc/dual), financial aspects, surgical procedures, dietary instruction pre- and post-transplant, health maintenance pre- and post-transplant, post-transplant hospitalization and outpatient follow-up, potential to participate in a research protocol, and medication management and side effects.  A question and answer session was provided after the presentation.    The patient was seen by all members of the multi-disciplinary team to include: Nephrologist/PA, Surgeon, , Transplant Coordinator, , Pharmacist and Dietician (if applicable).    The patient reviewed and signed all consents for evaluation which were witnessed and sent to scanning into the [facility] chart.    The patient was given an education book and plan for further evaluation based on her individual assessment.      The patient was encouraged to call with any questions or concerns.

## 2020-09-02 LAB — RPR SER QL: NORMAL

## 2020-09-03 LAB
GAMMA INTERFERON BACKGROUND BLD IA-ACNC: 0.01 IU/ML
HBV SURFACE AB SER QL IA: POSITIVE
HBV SURFACE AB SERPL IA-ACNC: NORMAL MIU/ML
HLA DRB4 1: NORMAL
HLA SSO DNA TYPING CLASS I & II INTERPRETATION: NORMAL
HLA-A 1 SERO. EQUIV: 26
HLA-A 1: NORMAL
HLA-A 2 SERO. EQUIV: 29
HLA-A 2: NORMAL
HLA-B 1 SERO. EQUIV: 13
HLA-B 1: NORMAL
HLA-B 2 SERO. EQUIV: 27
HLA-B 2: NORMAL
HLA-BW 1 SERO. EQUIV: 4
HLA-BW 2 SERO. EQUIV: NORMAL
HLA-C 1: NORMAL
HLA-C 2: NORMAL
HLA-CW 1 SERO. EQUIV: 6
HLA-CW 2 SERO. EQUIV: NORMAL
HLA-DQ 1 SERO. EQUIV: 4
HLA-DQ 2 SERO. EQUIV: 5
HLA-DQB1 1: NORMAL
HLA-DQB1 2: NORMAL
HLA-DRB1 1 SERO. EQUIV: 1
HLA-DRB1 1: NORMAL
HLA-DRB1 2 SERO. EQUIV: 18
HLA-DRB1 2: NORMAL
HLA-DRB3 1: NORMAL
HLA-DRB3 2: NORMAL
HLA-DRB345 1 SERO. EQUIV: 52
HLA-DRB345 2 SERO. EQUIV: NORMAL
HLA-DRB4 2: NORMAL
HLA-DRB5 1: NORMAL
HLA-DRB5 2: NORMAL
M TB IFN-G CD4+ BCKGRND COR BLD-ACNC: 0 IU/ML
MITOGEN IGNF BCKGRD COR BLD-ACNC: 7.18 IU/ML
SSDQB TESTING DATE: NORMAL
SSDRB TESTING DATE: NORMAL
SSOA TESTING DATE: NORMAL
SSOB TESTING DATE: NORMAL
SSOC TESTING DATE: NORMAL
SSODR TESTING DATE: NORMAL
STRONGYLOIDES ANTIBODY IGG: NEGATIVE
TB GOLD PLUS: NEGATIVE
TB2 - NIL: 0 IU/ML
TYSSO TESTING DATE: NORMAL
VARICELLA INTERPRETATION: POSITIVE
VARICELLA ZOSTER IGG: 3.44 ISR (ref 0–0.9)

## 2020-09-04 ENCOUNTER — COMMITTEE REVIEW (OUTPATIENT)
Dept: TRANSPLANT | Facility: CLINIC | Age: 65
End: 2020-09-04

## 2020-09-04 LAB — CMV IGG SERPL QL IA: REACTIVE

## 2020-09-04 NOTE — LETTER
September 4, 2020    Belinda Leticia  [address] [facility] [place] [address]    Dear Belinda Kelly:  MRN: [id number]    It is the duty of the [facility] Transplant Selection Committee to determine which patients are candidates for a transplant. For this reason, our committee has the difficult task of evaluating patients to determine which ones have the greatest chance of having a successful transplant. We are aware of the magnitude of this responsibility, and we approach it with reverence and humility.    It is with regret I inform you that you are not approved as a transplant candidate due to body mass index aboved 40 . Your BMI was 43.02. You will need to meet weight goal of 215 lbs . While you are working on your weight loss, please continue with your routine healthcare screening of mammogram, pap smear and colonoscopy, which are all due.   Based on this review, we have determined that at this time, you are not a candidate for a transplant at [facility].      The selection committee carefully considers each patient's transplant candidacy and determines whether it is safe to proceed with transplantation on a case-by-case basis using established selection criteria.  At present, the risk of proceeding with an elective transplant surgery has become too high.                                                                               Although the selection committee believes you are not a suitable transplant candidate, you have the option to be evaluated at other transplant centers who may have different selection criteria.  You may request your [facility] records be sent to any center of your choice by contacting our Medical Records Department at [phone].                                                                               Attached is a letter from the United Network for Organ Sharing (UNOS).  It describes the services and information offered to patients by UNOS and the Organ Procurement and Transplant  Network.    The [facility] Selection Committee sincerely wishes you the best and remains available to answer any questions.  Please do not hesitate to contact our pre-transplant office if we can assist you in any other way.                                                                               Sincerely,      Xiomara Blackburn MD  Medical Director, Kidney & Kidney/Pancreas Transplantation    Faxed to:  Shayna Segura MD                   [facility]                  Encl: UNOS Letter             The Organ Procurement and Transplantation Network   Toll-free patient services line: Your resource for organ transplant information     If you have a question regarding your own medical care, you always should call your transplant hospital first. However, for general organ transplant-related information, you can call the Organ Procurement and Transplantation Network (OPTN) toll-free patient services line at [phone].     Anyone, including potential transplant candidates, candidates, recipients, family members, friends, living donors, and donor family members, can call this number to:     · Talk about organ donation, living donation, the transplant process, the donation process, and transplant policies.   · Get a free patient information kit with helpful booklets, waiting list and transplant information, and a list of all transplant hospitals.   · Ask questions about the OPTN website (https://optn.transplant.hrsa.gov/), the United Network for Organ Sharings (UNOS) website (https://unos.org/), or the UNOS website for living donors and transplant recipients. (https://www.transplantliving.org/).   · Learn how the OPTN can help you.   · Talk about any concerns that you may have with a transplant hospital.     The nations transplant system, the OPTN, is managed under federal contract by the [employer] Network for Organ Sharing (UNOS), which is a non-profit charitable organization.  The OPTN helps create and define organ sharing policies that make the best use of donated organs. This process continuously evaluating new advances and discoveries so policies can be adapted to best serve patients waiting for transplants. To do so, the OPTN works closely with transplant professionals, transplant patients, transplant candidates, donor families, living donors, and the public. All transplant programs and organ procurement organizations throughout the country are OPTN members and are obligated to follow the policies the OPTN creates for allocating organs.     The OPTN also is responsible for:   · Providing educational material for patients, the public, and professionals.   · Raising awareness of the need for donated organs and tissue.   · Coordinating organ procurement, matching, and placement.   · Collecting information about every organ transplant and donation that occurs in the [place].     Remember, you should contact your transplant hospital directly if you have questions or concerns about your own medical care including medical records, work-up progress, and test results.     We are not your transplant hospital, and our staff will not be able to answer questions about your case, so please keep your transplant hospitals phone number handy.   However, while you research your transplant needs and learn as much as you can about transplantation and donation, we welcome your call to our toll-free patient services line at [phone].

## 2020-09-04 NOTE — COMMITTEE REVIEW
Native Organ Dx: Diabetes Mellitus - Type II      Not approved for LRD/CAD transplant due to BMI of 43.02. Patient will need to meet weight goal of 215 lbs . While you are working on your weight loss, please continue with your routine healthcare screening of mammogram, pap smear and colonoscopy, which are all due.       Note written by Charito Paul RN.    ===============================================    I was present at the meeting and attest to the decision of the committee

## 2020-09-14 LAB
B CELL RESULTS - XM AUTO: NEGATIVE
B MCS AVERAGE - XM AUTO: -63.8
FXMAU TESTING DATE: NORMAL
HLA AB QL: NEGATIVE
HLA AB SERPL: POSITIVE
HLATY INTERPRETATION: NORMAL
SCRFL TESTING DATE: NORMAL
SERUM COLLECTION DT - XM AUTO: NORMAL
SERUM COLLECTION DT: NORMAL
T CELL RESULTS - XM AUTO: NEGATIVE
T MCS AVERAGE - XM AUTO: -1.9

## 2020-09-16 LAB
CLASS I ANTIBODIES - LUMINEX: NORMAL
CLASS I ANTIBODY COMMENTS - LUMINEX: NORMAL
CLASS II ANTIBODIES - LUMINEX: NEGATIVE
CPRA %: 99
SERUM COLLECTION DT - LUMINEX CLASS I: NORMAL
SERUM COLLECTION DT - LUMINEX CLASS II: NORMAL
SPCL1 TESTING DATE: NORMAL
SPCL2 TESTING DATE: NORMAL
SPCLU TESTING DATE: NORMAL

## 2022-01-07 ENCOUNTER — TELEPHONE (OUTPATIENT)
Dept: TRANSPLANT | Facility: CLINIC | Age: 67
End: 2022-01-07
Payer: MEDICARE

## 2022-01-07 NOTE — LETTER
January 7, 2022    Yanick Segura  [address] [facility] [address]  [place] [address]  Phone: [phone]  Fax: [phone]    Dear Dr. Yanick Segura:    Patient: Belinda Kelly  MR Number [id number]  Date of Birth 1955    Thank you for your referral of Belinda Kelly to our transplant program.      Your patient's information will be screened by our Referral Nurse Coordinators to determine initial medical candidacy and if any further records are required. We will contact you if further information is needed to process the referral.     Once all needed information is received it will be forwarded to our Transplant Financial Coordinators who will obtain insurance authorization. Upon insurance approval, your patient will be contacted by our  to schedule their appointments with the transplant team. When appointments are made you will receive a copy of the appointment letter that your patient will receive.     This process may take two to six weeks to complete.     In the event your patient is not a candidate a copy of our transplant programs opinion including reasons will be returned to you to comply with your yearly review regulations. A copy of that letter will also be sent to the patient.     The following information is needed to process a referral:  Medicare form 2728 for all patients on dialysis.  Dental clearance is required if your patient has primary Medicaid.  Current immunization record.  This information can be faxed to [phone].  Current Dietician evaluation can be faxed to [phone].    Thank you again for your trust in our program and the care of your patient.  If there is anything we can do for you or your staff, please feel free to contact us at [phone].    Sincerely,   [facility]  Kidney & Kidney/Pancreas Transplant Team  [facility] [place] [address]  [phone]

## 2022-01-07 NOTE — TELEPHONE ENCOUNTER
Contacted patient to review initial intake information. Patient reports the followin. Can you walk up a flight of stairs without getting short of breath or stopping? No   2. Can you walk one block without getting short of breath or having to stop? No   3. Do you use oxygen? No   4. Do you use a cane, walker, or wheel chair to assist in mobility? No   5. Have you been hospitalized or had recent surgery in the last 6 months? No    A. Stroke   B. Heart surgery or heart catheterization   C. Broken bone  6. Do you have any cuts, open sores (ulcers), or wounds anywhere on your body? No  If yes, where and for how long?  7. Do you go to dialysis? Yes What days? M,w,f  8. Preferred appointment day?   9. Caregiver? Daughters Trent/Jovita    Patient is aware the next steps will include completing records and compliance verification. Patient is aware once provider review and insurance authorization is received we will contact patient to schedule initial visit. Patient is aware that initial visit will begin prior to / at 7 am and will conclude at approximately 3 pm on date of appointment. All questions answered at this time.

## 2022-01-10 ENCOUNTER — DOCUMENTATION ONLY (OUTPATIENT)
Dept: TRANSPLANT | Facility: CLINIC | Age: 67
End: 2022-01-10
Payer: MEDICARE

## 2022-05-01 NOTE — OP NOTE
DATE OF SURGERY:    06/17/2020    SURGEON:  Bre Krause MD  ASSISTANT:  AMRVA Solis    DIAGNOSES:    1. End-stage renal disease.  2. Left arm fistula revision with segmental resection and superficialization.    HISTORY OF PRESENT ILLNESS:  Mrs. Belinda Kelly is a 65-year-old woman with end-stage renal disease who has a left arm brachiocephalic fistula that has achieved a good size, but remains too deep for cannulation.  She presents today for revision.    PROCEDURE IN DETAIL:  Mrs. Kelly was taken to the operating room and placed in supine position where monitored anesthesia care was initiated.  Antibiotics were administered.  She did receive a regional block prior to arriving in the operating room.  Appropriate timeout was performed.  B-mode ultrasound was utilized to evaluate the cephalic vein fistula, which was deep but also tortuous.  We made a long incision over the course of the fistula, performed dissection down to the level of the fistula and ligated multiple branches.  The fissure was significantly more tortuous than expected and had significant branching.  After fully mobilizing the fistula, it was apparent that we would have to deal with the tortuosity.  Heparin was administered.  The fistula was clamped proximally and distally.  A segment was resected approximately 3 cm of fistula.  We did spatulate both ends of the transected fistula and end-to-end anastomosis was performed with interrupted 6-0 Prolene sutures.  We then restored flow through the fistula.  There was significant pulsatility.  It did appear there was some spasm distally which we were able to improve.  We also worked hard to ensure there was good release of any constrictive tissue distally.  Still remains some pulsatility that is concerning, but no further abnormalities were appreciated.  3-0 Vicryl suture was utilized to reapproximate the deeper subcutaneous tissues, thereby elevating the fistula.  4-0  Monocryl was then utilized to close the skin and a Dermabond Prineo dressing was placed.     Isabella Kurtz did scrub throughout.  She assisted with vessel exposure and reanastomosis and she performed skin closure.      ______________________________  MD GUANACO Sandra/  DD:  06/17/2020  Time:  09:08AM  DT:  06/17/2020  Time:  09:28AM  Job #:  [id number]

## 2022-05-01 NOTE — OP NOTE
Patient:   Belinda Kelly             MRN: [id number]            FIN: [id number]               Age:   64 years     Sex:  Female     :  1955   Associated Diagnoses:   End stage renal disease on dialysis   Author:   Bre Krause MD      Operative Note   Operative Information   Date/ Time:  2020 09:02:00.     Procedures Performed: Left brachiocephalic fistula   .     Indications: Mrs. Kelly is a 65 y/o woman with ESRD who needs long term hemodialysis access creation.   Preoperative mapping suggests a suitable left upper arm cephalic vein..     Preoperative Diagnosis: End stage renal disease on dialysis (ZBO56-TX N18.6).     Postoperative Diagnosis: End stage renal disease on dialysis ([id number] N18.6).     Surgeon: Bre Krause MD.     Assistant: Isabella Lopez.     Speciman Removed: none   .     Esimated blood loss: loss less than  100  cc.     Description of Procedure/Findings/    Complications: The risks and benefits of the procedure were discussed with the patient prior to the procedure and the patient desires to proceed.   Her left arm was prepped in the usual sterile fashion.  Cefzol was administered prior to the incision.  An appropriate time out was performed.  Ultrasound was utlilized to identify the cephalic vein in the upper arm.  It was patent throughout and appropriate in size in the upper arm.   A curvilinear incision was made over the brachial artery and the cephalic vein just proximal to the antecubital fossa.   Dissection was performed through the superficial soft tissues and then followed the appropriate plane laterally and medially to identify the cephalic vein and brachial artery.  Each of these vessels were isolated and side branches were ligated with 4-0 silk when appropriate.  The cephalic vein was ligated distally with a 3-0 silk. The brachial artery had no disease and appeared appropriate for fistula creation.  The cephalic vein easily accepted a 4mm  dilator and demonstrated a thrill with heparin saline injection.  5000 units of heparin was given IV.  A longitudinal incision was made on the brachial artery.  The vein was spatulated appropriately.  Anastamosis was created with a 6-0 prolene suture. Hemostasis was obtained and the soft tissue was dissected to allow for appropriate lay of the vein graft.  3-0 vicryl was utilized to close the subcutaneous structures and a 4-0 monocryl was utlilzed to close the skin. There was a strong thrill to the fistula and a palpable radial pulse. Dermabond was used to dress the wound. This completed the procedure.      Isabella Kurtz assisted throughout.   She assisted with vessel anastamosis.   She performed wound closure.        .     Findings:    ,    .     Complications: None.

## 2022-09-23 ENCOUNTER — TELEPHONE (OUTPATIENT)
Dept: TRANSPLANT | Facility: CLINIC | Age: 67
End: 2022-09-23
Payer: MEDICARE

## 2022-09-27 ENCOUNTER — TELEPHONE (OUTPATIENT)
Dept: TRANSPLANT | Facility: CLINIC | Age: 67
End: 2022-09-27
Payer: MEDICARE

## 2022-10-04 DIAGNOSIS — Z76.82 ORGAN TRANSPLANT CANDIDATE: Primary | ICD-10-CM

## 2022-10-11 ENCOUNTER — TELEPHONE (OUTPATIENT)
Dept: TRANSPLANT | Facility: CLINIC | Age: 67
End: 2022-10-11
Payer: MEDICARE

## 2022-10-17 ENCOUNTER — TELEPHONE (OUTPATIENT)
Dept: TRANSPLANT | Facility: CLINIC | Age: 67
End: 2022-10-17
Payer: MEDICARE

## 2022-10-19 ENCOUNTER — TELEPHONE (OUTPATIENT)
Dept: TRANSPLANT | Facility: CLINIC | Age: 67
End: 2022-10-19
Payer: MEDICARE

## 2022-11-21 ENCOUNTER — TELEPHONE (OUTPATIENT)
Dept: TRANSPLANT | Facility: CLINIC | Age: 67
End: 2022-11-21
Payer: MEDICARE

## 2022-11-21 ENCOUNTER — HOSPITAL ENCOUNTER (EMERGENCY)
Facility: HOSPITAL | Age: 67
Discharge: LEFT BEFORE DISCHARGE | End: 2022-11-22
Payer: MEDICARE

## 2022-11-21 VITALS — HEART RATE: 86 BPM | RESPIRATION RATE: 18 BRPM | TEMPERATURE: 98 F | OXYGEN SATURATION: 95 %

## 2022-11-21 LAB
ALBUMIN SERPL-MCNC: 3.7 GM/DL (ref 3.4–4.8)
ALBUMIN/GLOB SERPL: 1 RATIO (ref 1.1–2)
ALP SERPL-CCNC: 139 UNIT/L (ref 40–150)
ALT SERPL-CCNC: 12 UNIT/L (ref 0–55)
AST SERPL-CCNC: 17 UNIT/L (ref 5–34)
BASOPHILS # BLD AUTO: 0.06 X10(3)/MCL (ref 0–0.2)
BASOPHILS NFR BLD AUTO: 0.9 %
BILIRUBIN DIRECT+TOT PNL SERPL-MCNC: 0.6 MG/DL
BUN SERPL-MCNC: 26.3 MG/DL (ref 9.8–20.1)
CALCIUM SERPL-MCNC: 9.1 MG/DL (ref 8.4–10.2)
CHLORIDE SERPL-SCNC: 99 MMOL/L (ref 98–107)
CO2 SERPL-SCNC: 28 MMOL/L (ref 23–31)
CREAT SERPL-MCNC: 3.51 MG/DL (ref 0.55–1.02)
EOSINOPHIL # BLD AUTO: 0.2 X10(3)/MCL (ref 0–0.9)
EOSINOPHIL NFR BLD AUTO: 2.8 %
ERYTHROCYTE [DISTWIDTH] IN BLOOD BY AUTOMATED COUNT: 14.8 % (ref 11.5–17)
GFR SERPLBLD CREATININE-BSD FMLA CKD-EPI: 14 MLS/MIN/1.73/M2
GLOBULIN SER-MCNC: 3.6 GM/DL (ref 2.4–3.5)
GLUCOSE SERPL-MCNC: 153 MG/DL (ref 82–115)
HCT VFR BLD AUTO: 39.3 % (ref 37–47)
HGB BLD-MCNC: 12.9 GM/DL (ref 12–16)
IMM GRANULOCYTES # BLD AUTO: 0.02 X10(3)/MCL (ref 0–0.04)
IMM GRANULOCYTES NFR BLD AUTO: 0.3 %
LYMPHOCYTES # BLD AUTO: 2.13 X10(3)/MCL (ref 0.6–4.6)
LYMPHOCYTES NFR BLD AUTO: 30.2 %
MCH RBC QN AUTO: 31.4 PG (ref 27–31)
MCHC RBC AUTO-ENTMCNC: 32.8 MG/DL (ref 33–36)
MCV RBC AUTO: 95.6 FL (ref 80–94)
MONOCYTES # BLD AUTO: 0.51 X10(3)/MCL (ref 0.1–1.3)
MONOCYTES NFR BLD AUTO: 7.2 %
NEUTROPHILS # BLD AUTO: 4.1 X10(3)/MCL (ref 2.1–9.2)
NEUTROPHILS NFR BLD AUTO: 58.6 %
NRBC BLD AUTO-RTO: 0 %
PLATELET # BLD AUTO: 302 X10(3)/MCL (ref 130–400)
PMV BLD AUTO: 10.7 FL (ref 7.4–10.4)
POTASSIUM SERPL-SCNC: 4.4 MMOL/L (ref 3.5–5.1)
PROT SERPL-MCNC: 7.3 GM/DL (ref 5.8–7.6)
RBC # BLD AUTO: 4.11 X10(6)/MCL (ref 4.2–5.4)
SODIUM SERPL-SCNC: 137 MMOL/L (ref 136–145)
WBC # SPEC AUTO: 7.1 X10(3)/MCL (ref 4.5–11.5)

## 2022-11-21 PROCEDURE — 99283 EMERGENCY DEPT VISIT LOW MDM: CPT | Mod: NTX

## 2022-11-21 PROCEDURE — 85025 COMPLETE CBC W/AUTO DIFF WBC: CPT | Mod: NTX | Performed by: PHYSICIAN ASSISTANT

## 2022-11-21 PROCEDURE — 80053 COMPREHEN METABOLIC PANEL: CPT | Mod: NTX | Performed by: PHYSICIAN ASSISTANT

## 2022-11-21 NOTE — LETTER
Date: 11/21/2022          Referral Process      To: Dialysis Unit  and Charge RN From: [facility] and      Kidney Transplant Nurse Coordinators    RE: Belinda Leticia, 1955, [id number]     At [facility], we conduct adherence checks as an important part of transplant care. Initial and listed patient assessments are not complete without adherence information.        Please complete the following information:                 Data from the last 3 months:  (data from last 3 months preferred):    Number of AMAs with dates, time, and reasons: ____________________________________________________    ______________________________________________________________________________________________    ______________________________________________________________________________________________    Number of No-Shows with dates and reasons: ______________________________________________________      ______________________________________________________________________________________________      Any concerns with Caregivers:  YES / NO    If yes, please explain:  ___________________________________________________________________________    ______________________________________________________________________________________________     Any concerns with Transportation:  YES / NO    If yes, please explain:  ___________________________________________________________________________    ______________________________________________________________________________________________    Any Psychiatric and/or Psychosocial concerns:  YES / NO     If yes, please explain: ___________________________________________________________________________    ______________________________________________________________________________________________      PLEASE RETURN TO: FAX: [phone]     Thank you for collaborating with us in the care of this patient.           [address]  ?  [place] [address]  ?  phone [phone]  ?  fax [phone]  ?  [email or web address]  Confidentiality notice: The accompanying facsimile is intended solely for the use of the recipient designated above. Document(s) transmitted herewith may contain information that is confidential and privileged. Delivery, distribution or dissemination of this communication other than to the intended recipient is strictly prohibited. If you have received this facsimile in error, please notify us immediately by telephone.

## 2022-11-21 NOTE — FIRST PROVIDER EVALUATION
Medical screening examination initiated.  I have conducted a focused provider triage encounter, findings are as follows:    Chief Complaint   Patient presents with    Abdominal Pain     Pt. C/o right lower abd pain that radiates down right leg to foot.. reports has shingles infection to right buttocks started last sat..reports is on tx list for kidneys.. denies fever      Brief history of present illness: 67 y.o. female presents to the ED with right lower abdominal pain that radiates down RLE. Recently diagnosed with shingles on right buttocks.     Vitals:    11/21/22 1448   Pulse: 86   Resp: 18   Temp: 98.1 °F (36.7 °C)   SpO2: 95%     Pertinent physical exam:  Awake, alert, non-labored respirations    Brief workup plan:  labs    Preliminary workup initiated; this workup will be continued and followed by the physician or advanced practice provider that is assigned to the patient when roomed.

## 2022-11-22 ENCOUNTER — TELEPHONE (OUTPATIENT)
Dept: TRANSPLANT | Facility: CLINIC | Age: 67
End: 2022-11-22
Payer: MEDICARE

## 2022-11-22 NOTE — TELEPHONE ENCOUNTER
----- Message from Charito Paul RN sent at 11/21/2022  6:00 PM CST -----  Regarding: RE: reschedule appt    ----- Message -----  From: Alysha Hidalgo  Sent: 11/21/2022  11:42 AM CST  To: [facility] Pre-Kidney Transplant Non-Clinical  Subject: reschedule appt                                  Patient calling to reschedule appt due to having shingles. Requesting a call back.      Call: [phone] (Mobile

## 2022-12-29 ENCOUNTER — OFFICE VISIT (OUTPATIENT)
Dept: TRANSPLANT | Facility: CLINIC | Age: 67
End: 2022-12-29
Payer: MEDICARE

## 2022-12-29 ENCOUNTER — TELEPHONE (OUTPATIENT)
Dept: TRANSPLANT | Facility: CLINIC | Age: 67
End: 2022-12-29
Payer: MEDICARE

## 2022-12-29 ENCOUNTER — LAB VISIT (OUTPATIENT)
Dept: LAB | Facility: HOSPITAL | Age: 67
End: 2022-12-29
Attending: NURSE PRACTITIONER
Payer: MEDICARE

## 2022-12-29 VITALS
WEIGHT: 218.06 LBS | TEMPERATURE: 98 F | HEIGHT: 62 IN | DIASTOLIC BLOOD PRESSURE: 80 MMHG | BODY MASS INDEX: 40.13 KG/M2 | HEART RATE: 94 BPM | RESPIRATION RATE: 16 BRPM | SYSTOLIC BLOOD PRESSURE: 170 MMHG | OXYGEN SATURATION: 94 %

## 2022-12-29 DIAGNOSIS — Z01.818 PRE-TRANSPLANT EVALUATION FOR KIDNEY TRANSPLANT: Primary | ICD-10-CM

## 2022-12-29 DIAGNOSIS — N18.6 STAGE 5 CHRONIC KIDNEY DISEASE ON CHRONIC DIALYSIS: ICD-10-CM

## 2022-12-29 DIAGNOSIS — E11.21 DIABETIC NEPHROPATHY ASSOCIATED WITH TYPE 2 DIABETES MELLITUS: ICD-10-CM

## 2022-12-29 DIAGNOSIS — Z99.2 STAGE 5 CHRONIC KIDNEY DISEASE ON CHRONIC DIALYSIS: ICD-10-CM

## 2022-12-29 DIAGNOSIS — N25.81 SECONDARY HYPERPARATHYROIDISM OF RENAL ORIGIN: ICD-10-CM

## 2022-12-29 DIAGNOSIS — D50.9 IRON DEFICIENCY ANEMIA, UNSPECIFIED IRON DEFICIENCY ANEMIA TYPE: ICD-10-CM

## 2022-12-29 DIAGNOSIS — Z76.82 ORGAN TRANSPLANT CANDIDATE: ICD-10-CM

## 2022-12-29 DIAGNOSIS — I10 PRIMARY HYPERTENSION: ICD-10-CM

## 2022-12-29 DIAGNOSIS — E66.01 SEVERE OBESITY (BMI >= 40): ICD-10-CM

## 2022-12-29 PROBLEM — N18.4 CKD (CHRONIC KIDNEY DISEASE) STAGE 4, GFR 15-29 ML/MIN: Status: RESOLVED | Noted: 2017-09-25 | Resolved: 2022-12-29

## 2022-12-29 LAB
ABO + RH BLD: NORMAL
ALBUMIN SERPL BCP-MCNC: 3.6 G/DL (ref 3.5–5.2)
ALP SERPL-CCNC: 118 U/L (ref 55–135)
ALT SERPL W/O P-5'-P-CCNC: 15 U/L (ref 10–44)
ANION GAP SERPL CALC-SCNC: 12 MMOL/L (ref 8–16)
APTT BLDCRRT: 28.5 SEC (ref 21–32)
AST SERPL-CCNC: 18 U/L (ref 10–40)
BASOPHILS # BLD AUTO: 0.05 K/UL (ref 0–0.2)
BASOPHILS NFR BLD: 0.8 % (ref 0–1.9)
BILIRUB DIRECT SERPL-MCNC: 0.3 MG/DL (ref 0.1–0.3)
BILIRUB SERPL-MCNC: 0.9 MG/DL (ref 0.1–1)
BLD GP AB SCN CELLS X3 SERPL QL: NORMAL
BUN SERPL-MCNC: 22 MG/DL (ref 8–23)
CALCIUM SERPL-MCNC: 8.7 MG/DL (ref 8.7–10.5)
CHLORIDE SERPL-SCNC: 102 MMOL/L (ref 95–110)
CHOLEST SERPL-MCNC: 145 MG/DL (ref 120–199)
CHOLEST/HDLC SERPL: 3.5 {RATIO} (ref 2–5)
CMV IGG SERPL QL IA: REACTIVE
CO2 SERPL-SCNC: 27 MMOL/L (ref 23–29)
CREAT SERPL-MCNC: 4 MG/DL (ref 0.5–1.4)
DIFFERENTIAL METHOD: ABNORMAL
EOSINOPHIL # BLD AUTO: 0.1 K/UL (ref 0–0.5)
EOSINOPHIL NFR BLD: 2 % (ref 0–8)
ERYTHROCYTE [DISTWIDTH] IN BLOOD BY AUTOMATED COUNT: 14.8 % (ref 11.5–14.5)
EST. GFR  (NO RACE VARIABLE): 11.8 ML/MIN/1.73 M^2
ESTIMATED AVG GLUCOSE: 114 MG/DL (ref 68–131)
GLUCOSE SERPL-MCNC: 133 MG/DL (ref 70–110)
HBA1C MFR BLD: 5.6 % (ref 4–5.6)
HBV CORE AB SERPL QL IA: REACTIVE
HBV SURFACE AG SERPL QL IA: NORMAL
HCT VFR BLD AUTO: 34.4 % (ref 37–48.5)
HCV AB SERPL QL IA: NORMAL
HDLC SERPL-MCNC: 42 MG/DL (ref 40–75)
HDLC SERPL: 29 % (ref 20–50)
HGB BLD-MCNC: 11.1 G/DL (ref 12–16)
HIV 1+2 AB+HIV1 P24 AG SERPL QL IA: NORMAL
IMM GRANULOCYTES # BLD AUTO: 0.02 K/UL (ref 0–0.04)
IMM GRANULOCYTES NFR BLD AUTO: 0.3 % (ref 0–0.5)
INR PPP: 1.1 (ref 0.8–1.2)
LDLC SERPL CALC-MCNC: 79 MG/DL (ref 63–159)
LYMPHOCYTES # BLD AUTO: 1 K/UL (ref 1–4.8)
LYMPHOCYTES NFR BLD: 15.7 % (ref 18–48)
MCH RBC QN AUTO: 32.2 PG (ref 27–31)
MCHC RBC AUTO-ENTMCNC: 32.3 G/DL (ref 32–36)
MCV RBC AUTO: 100 FL (ref 82–98)
MONOCYTES # BLD AUTO: 0.6 K/UL (ref 0.3–1)
MONOCYTES NFR BLD: 9 % (ref 4–15)
NEUTROPHILS # BLD AUTO: 4.7 K/UL (ref 1.8–7.7)
NEUTROPHILS NFR BLD: 72.2 % (ref 38–73)
NONHDLC SERPL-MCNC: 103 MG/DL
NRBC BLD-RTO: 0 /100 WBC
PHOSPHATE SERPL-MCNC: 3.7 MG/DL (ref 2.7–4.5)
PLATELET # BLD AUTO: 310 K/UL (ref 150–450)
PMV BLD AUTO: 11.8 FL (ref 9.2–12.9)
POTASSIUM SERPL-SCNC: 3.4 MMOL/L (ref 3.5–5.1)
PROT SERPL-MCNC: 6.7 G/DL (ref 6–8.4)
PROTHROMBIN TIME: 11.4 SEC (ref 9–12.5)
PTH-INTACT SERPL-MCNC: 560.2 PG/ML (ref 9–77)
RBC # BLD AUTO: 3.45 M/UL (ref 4–5.4)
SODIUM SERPL-SCNC: 141 MMOL/L (ref 136–145)
TRIGL SERPL-MCNC: 120 MG/DL (ref 30–150)
WBC # BLD AUTO: 6.54 K/UL (ref 3.9–12.7)

## 2022-12-29 PROCEDURE — 86704 HEP B CORE ANTIBODY TOTAL: CPT | Mod: TXP | Performed by: NURSE PRACTITIONER

## 2022-12-29 PROCEDURE — 86803 HEPATITIS C AB TEST: CPT | Mod: TXP | Performed by: NURSE PRACTITIONER

## 2022-12-29 PROCEDURE — 99204 PR OFFICE/OUTPT VISIT, NEW, LEVL IV, 45-59 MIN: ICD-10-PCS | Mod: S$PBB,TXP,, | Performed by: TRANSPLANT SURGERY

## 2022-12-29 PROCEDURE — 99205 OFFICE O/P NEW HI 60 MIN: CPT | Mod: S$PBB,TXP,, | Performed by: NURSE PRACTITIONER

## 2022-12-29 PROCEDURE — 85610 PROTHROMBIN TIME: CPT | Mod: TXP | Performed by: NURSE PRACTITIONER

## 2022-12-29 PROCEDURE — 86480 TB TEST CELL IMMUN MEASURE: CPT | Mod: TXP | Performed by: NURSE PRACTITIONER

## 2022-12-29 PROCEDURE — 82248 BILIRUBIN DIRECT: CPT | Mod: TXP | Performed by: NURSE PRACTITIONER

## 2022-12-29 PROCEDURE — 80053 COMPREHEN METABOLIC PANEL: CPT | Mod: TXP | Performed by: NURSE PRACTITIONER

## 2022-12-29 PROCEDURE — 87389 HIV-1 AG W/HIV-1&-2 AB AG IA: CPT | Mod: TXP | Performed by: NURSE PRACTITIONER

## 2022-12-29 PROCEDURE — 83036 HEMOGLOBIN GLYCOSYLATED A1C: CPT | Mod: TXP | Performed by: NURSE PRACTITIONER

## 2022-12-29 PROCEDURE — 86644 CMV ANTIBODY: CPT | Mod: TXP | Performed by: NURSE PRACTITIONER

## 2022-12-29 PROCEDURE — 99999 PR PBB SHADOW E&M-EST. PATIENT-LVL III: ICD-10-PCS | Mod: PBBFAC,TXP,, | Performed by: NURSE PRACTITIONER

## 2022-12-29 PROCEDURE — 86900 BLOOD TYPING SEROLOGIC ABO: CPT | Mod: TXP | Performed by: NURSE PRACTITIONER

## 2022-12-29 PROCEDURE — 85025 COMPLETE CBC W/AUTO DIFF WBC: CPT | Mod: TXP | Performed by: NURSE PRACTITIONER

## 2022-12-29 PROCEDURE — 99204 OFFICE O/P NEW MOD 45 MIN: CPT | Mod: S$PBB,TXP,, | Performed by: TRANSPLANT SURGERY

## 2022-12-29 PROCEDURE — 83970 ASSAY OF PARATHORMONE: CPT | Mod: TXP | Performed by: NURSE PRACTITIONER

## 2022-12-29 PROCEDURE — 86592 SYPHILIS TEST NON-TREP QUAL: CPT | Mod: TXP | Performed by: NURSE PRACTITIONER

## 2022-12-29 PROCEDURE — 99999 PR PBB SHADOW E&M-EST. PATIENT-LVL III: CPT | Mod: PBBFAC,TXP,, | Performed by: NURSE PRACTITIONER

## 2022-12-29 PROCEDURE — 86706 HEP B SURFACE ANTIBODY: CPT | Mod: TXP | Performed by: NURSE PRACTITIONER

## 2022-12-29 PROCEDURE — 36415 COLL VENOUS BLD VENIPUNCTURE: CPT | Mod: TXP | Performed by: NURSE PRACTITIONER

## 2022-12-29 PROCEDURE — 85730 THROMBOPLASTIN TIME PARTIAL: CPT | Mod: TXP | Performed by: NURSE PRACTITIONER

## 2022-12-29 PROCEDURE — 84100 ASSAY OF PHOSPHORUS: CPT | Mod: TXP | Performed by: NURSE PRACTITIONER

## 2022-12-29 PROCEDURE — 99213 OFFICE O/P EST LOW 20 MIN: CPT | Mod: PBBFAC,TXP | Performed by: NURSE PRACTITIONER

## 2022-12-29 PROCEDURE — 99205 PR OFFICE/OUTPT VISIT, NEW, LEVL V, 60-74 MIN: ICD-10-PCS | Mod: S$PBB,TXP,, | Performed by: NURSE PRACTITIONER

## 2022-12-29 PROCEDURE — 86787 VARICELLA-ZOSTER ANTIBODY: CPT | Mod: TXP | Performed by: NURSE PRACTITIONER

## 2022-12-29 PROCEDURE — 87340 HEPATITIS B SURFACE AG IA: CPT | Mod: TXP | Performed by: NURSE PRACTITIONER

## 2022-12-29 PROCEDURE — 80061 LIPID PANEL: CPT | Mod: TXP | Performed by: NURSE PRACTITIONER

## 2022-12-29 RX ORDER — INSULIN GLARGINE 100 [IU]/ML
18 INJECTION, SOLUTION SUBCUTANEOUS NIGHTLY
COMMUNITY
Start: 2022-09-29

## 2022-12-29 RX ORDER — LACTULOSE 10 G/15ML
30 SOLUTION ORAL; RECTAL
COMMUNITY
Start: 2022-11-11

## 2022-12-29 RX ORDER — CIPROFLOXACIN 500 MG/1
500 TABLET ORAL 2 TIMES DAILY
COMMUNITY
Start: 2022-12-22

## 2022-12-29 RX ORDER — PHENAZOPYRIDINE HYDROCHLORIDE 200 MG/1
200 TABLET, FILM COATED ORAL 3 TIMES DAILY
COMMUNITY
Start: 2022-12-22

## 2022-12-29 RX ORDER — PANTOPRAZOLE SODIUM 40 MG/1
40 TABLET, DELAYED RELEASE ORAL DAILY
COMMUNITY
Start: 2022-12-17

## 2022-12-29 RX ORDER — SEVELAMER CARBONATE 800 MG/1
2400 TABLET, FILM COATED ORAL
COMMUNITY
Start: 2022-08-11

## 2022-12-29 RX ORDER — BUDESONIDE AND FORMOTEROL FUMARATE DIHYDRATE 160; 4.5 UG/1; UG/1
1 AEROSOL RESPIRATORY (INHALATION)
COMMUNITY
Start: 2022-08-02

## 2022-12-29 RX ORDER — CLONIDINE HYDROCHLORIDE 0.1 MG/1
0.1 TABLET ORAL DAILY
COMMUNITY
Start: 2022-11-05

## 2022-12-29 RX ORDER — ALLOPURINOL 100 MG/1
100 TABLET ORAL DAILY
COMMUNITY
Start: 2022-11-09

## 2022-12-29 RX ORDER — IBUPROFEN 800 MG/1
800 TABLET ORAL
COMMUNITY
Start: 2022-12-13

## 2022-12-29 RX ORDER — OXYCODONE AND ACETAMINOPHEN 7.5; 325 MG/1; MG/1
1 TABLET ORAL
COMMUNITY
Start: 2022-11-29

## 2022-12-29 NOTE — PROGRESS NOTES
Transplant Nephrology  Kidney Transplant Recipient Evaluation    Referring Physician: Yanick Segura  Current Nephrologist: Yanick Segura    Subjective:   CC:  Initial evaluation of kidney transplant candidacy.    HPI:  Ms. Kelly is a 67 y.o. year old Black or  female who has presented to be evaluated as a potential kidney transplant recipient.  She has ESRD secondary to diabetic nephropathy and HTN.  Patient is currently on hemodialysis started on 10/4/2019. Patient is dialyzing on MWF schedule.  Patient reports that she is tolerating dialysis well.. She has a RUE AV fistula for dialysis access.     Body mass index is 40.02 kg/m².    Patient has been diabetic since the age of 28 and DM associated retinopathy and neuropathy. She is on insulin. Does have history of recurrent UTIs. She developed HTN in her early 20s. She was evaluated for kidney TXP in 2020 but unfortunately at that time her BMI was 43.02 which was above the required. She was encouraged to lose weight and then return. She is currently with a BMI 40.02. Will continue to with evaluation but needs to continue to lose weight. She denies heart disease, pulmonary disease, liver disease, stroke, DVt/PE, chronic wounds/infections/amputations.  Functional Status: Fell getting into her bed. Reports high bed and uses step stool to get in. She slipped while stepping upward while using socks only. She broke 4 toes on her right foot. She is using a crutches for ambulating and a walking boot. She did arrive with in wheelchair today.      10/28/2015  FINAL PATHOLOGIC DIAGNOSIS [place] DIAGNOSIS: KIDNEY (PERCUTANEOUS BIOPSY): 1. SEVERE DIABETIC NEPHROPATHY; 2. MODERATE ARTERIONEPHROSCLEROSIS    Previous Transplant: no  Previous Blood Transfusion: yes  Previous Pregnancy: 3--2 children 1 miscarriage --complications diabetes, preeclapcia  Previous neurogenic bladder/ urine incontinence still urinates without B/B incontinence  Anticoagulation/  antiplatelet therapy and reason: ASA 81  Potential Donor: no  High KDPI candidate: no due to weight  Meets center eligibility for accepting HCV+ donor offer: yes      Current Outpatient Medications:     allopurinoL (ZYLOPRIM) 100 MG tablet, Take 100 mg by mouth once daily., Disp: , Rfl:     BASAGLAR KWIKPEN U-100 INSULIN glargine 100 units/mL SubQ pen, Inject 18 Units into the skin every evening., Disp: , Rfl:     ciprofloxacin HCl (CIPRO) 500 MG tablet, Take 500 mg by mouth 2 (two) times daily., Disp: , Rfl:     cloNIDine (CATAPRES) 0.1 MG tablet, Take 0.1 mg by mouth once daily., Disp: , Rfl:     hydrALAZINE (APRESOLINE) 100 MG tablet, Take 100 mg by mouth once daily., Disp: , Rfl:     ibuprofen (ADVIL,MOTRIN) 800 MG tablet, Take 800 mg by mouth as needed., Disp: , Rfl:     insulin glargine (LANTUS) 100 unit/mL injection, Inject 40 Units into the skin every evening. , Disp: , Rfl:     minoxidiL (LONITEN) 2.5 MG tablet, Take 2.5 mg by mouth once daily., Disp: , Rfl:     NIFEdipine (ADALAT CC) 90 MG TbSR, Take 90 mg by mouth once daily., Disp: , Rfl:     oxyCODONE-acetaminophen (PERCOCET) 7.5-325 mg per tablet, Take 1 tablet by mouth as needed., Disp: , Rfl:     pantoprazole (PROTONIX) 40 MG tablet, Take 40 mg by mouth once daily., Disp: , Rfl:     phenazopyridine (PYRIDIUM) 200 MG tablet, Take 200 mg by mouth 3 (three) times daily., Disp: , Rfl:     RENVELA 800 mg Tab, Take 2,400 mg by mouth every Mon, Wed, Fri., Disp: , Rfl:     SYMBICORT 160-4.5 mcg/actuation HFAA, Inhale 1 puff into the lungs as needed., Disp: , Rfl:     VENTOLIN HFA 90 mcg/actuation inhaler, INHALE 2 PUFFS PO Q 6 H PRN, Disp: , Rfl: 1    lactulose (CHRONULAC) 10 gram/15 mL solution, Take 30 mLs by mouth as needed., Disp: , Rfl:     Current Facility-Administered Medications:     perflutren protein-A microsphr 0.22 mg/mL IV susp, 1 mL, Intravenous, Once, Moni Deng MD      Past Medical and Surgical History: Ms. Kelly  has a past medical  history of Anemia, Chronic kidney disease-mineral and bone disorder, CKD (chronic kidney disease) stage 4, GFR 15-29 ml/min, CKD (chronic kidney disease) stage 5, GFR less than 15 ml/min, Diabetes mellitus, Diabetic nephropathy, Disorder of kidney and ureter, Edema, Encounter for blood transfusion, Hypertension, Hyperuricemia, Hypoalbuminemia, Hypocalcemia, Nephrotic syndrome, Protein malnutrition, Proteinuria, Secondary hyperparathyroidism, Secondary hyperparathyroidism of renal origin, and Vitamin D deficiency.  She has a past surgical history that includes Hysterectomy (); Appendectomy ();  section (, ); PD cath placement; Peritoneal catheter removal (N/A, 8/15/2019); and Laparoscopic lysis of adhesions (8/15/2019).    Past Social and Family History: Ms. Kelly reports that she has never smoked. She has never used smokeless tobacco. She reports that she does not drink alcohol and does not use drugs. Her family history includes Diabetes in her father, mother, sister, and sister; Heart disease in her mother; Hypertension in her daughter, daughter, father, mother, sister, and sister; Liver disease in her father; No Known Problems in her brother.    Review of Systems   Constitutional:  Negative for appetite change, chills, fatigue and fever.   HENT:  Negative for trouble swallowing.    Eyes:  Positive for visual disturbance (wears glasses).   Respiratory:  Positive for shortness of breath. Negative for cough, chest tightness and wheezing.    Cardiovascular:  Positive for leg swelling. Negative for chest pain and palpitations.   Gastrointestinal:  Negative for abdominal pain, constipation, diarrhea and nausea.   Genitourinary:  Positive for decreased urine volume.   Musculoskeletal:  Positive for arthralgias and back pain. Negative for myalgias.   Skin:  Negative for rash.   Neurological:  Positive for headaches. Negative for dizziness, weakness and light-headedness.   Psychiatric/Behavioral:   "Positive for sleep disturbance.      Objective:   Blood pressure (!) 170/80, pulse 94, temperature 97.7 °F (36.5 °C), temperature source Tympanic, resp. rate 16, height 5' 1.89" (1.572 m), weight 98.9 kg (218 lb 0.6 oz), SpO2 (!) 94 %.body mass index is 40.02 kg/m².    Physical Exam  Constitutional:       General: She is not in acute distress.     Appearance: She is well-developed. She is not diaphoretic.   Cardiovascular:      Rate and Rhythm: Normal rate and regular rhythm.      Heart sounds: Normal heart sounds. No murmur heard.    No friction rub. No gallop.   Pulmonary:      Effort: Pulmonary effort is normal. No respiratory distress.      Breath sounds: Normal breath sounds. No wheezing or rales.   Abdominal:      General: Bowel sounds are normal. There is no distension.      Palpations: Abdomen is soft.      Tenderness: There is no abdominal tenderness.      Comments: centralized abd obesity    Musculoskeletal:         General: No tenderness. Normal range of motion.   Skin:     General: Skin is warm and dry.      Findings: No rash.      Nails: There is no clubbing.          Neurological:      Mental Status: She is alert and oriented to person, place, and time.   Psychiatric:         Behavior: Behavior normal.       Labs:  Lab Results   Component Value Date    WBC 7.1 11/21/2022    HGB 12.9 11/21/2022    HCT 39.3 11/21/2022     11/21/2022    K 4.4 11/21/2022     09/01/2020    CO2 28 11/21/2022    BUN 26.3 (H) 11/21/2022    CREATININE 3.51 (H) 11/21/2022    EGFRNONAA 7 09/08/2021    EGFRIFAFRICA 8 09/08/2021    GLUCOSE 153 (H) 11/21/2022    CALCIUM 9.1 11/21/2022    PHOS 5.1 (H) 09/08/2021    MG 2.10 09/08/2021    ALBUMIN 3.7 11/21/2022    AST 17 11/21/2022    ALT 12 11/21/2022    [id number] 10 (L) 01/23/2019    UTPCR 9.8 (H) 09/21/2015    .0 (H) 09/01/2020       Lab Results   Component Value Date    BILIRUBINUA Negative 09/21/2015    PROTEINUA 3+ (A) 07/09/2019    NITRITE NEGATIVE 07/09/2019 "    RBCUA 3 09/21/2015    WBCUA 1 09/21/2015       No results found for: HLAABCTYPE    Labs were reviewed with the patient.    Assessment:     1. Pre-transplant evaluation for kidney transplant    2. Stage 5 chronic kidney disease on chronic dialysis    3. Primary hypertension    4. Diabetic nephropathy associated with type 2 diabetes mellitus    5. Iron deficiency anemia, unspecified iron deficiency anemia type    6. Secondary hyperparathyroidism of renal origin    7. Severe obesity (BMI >= 40)        Plan:   Ms. Kelly is a 67 y.o. year old Black or  female who has presented to be evaluated as a potential kidney transplant recipient.  She has ESRD secondary to diabetic nephropathy and HTN.  Patient is currently on hemodialysis started on 10/4/2019.     Decrease mobility, high frailty, large central obesity, PRA with 99%--Needs to be discussed in committee prior to proceeding with evaluation    Prior to Listing, will need the following items to be completed:  1. Standard serologies, cardiac and imaging studies   2. Colonoscopy--obtain results from [facility] 2/2022  3. Needs pelvic exam, no PAP needed had hysterectomy  4. Has cardiac f/u and testing scheduled for 1/3/22  5. F/u in 6 months needed.     Body mass index is 40.02 kg/m².---walking boot in place to right foot due to 4 toes fractured on right foot.   Encourage lifestyle modifications: diet, exercise, weight loss   Needs to maintain acceptable BMI for txp/guidelines     Transplant Candidacy:   Based on available information, Ms. Kelly is a marginal kidney transplant candidate. DM, recent fall with fracture, obesity, PRA 99%   Meets center eligibility for accepting HCV+ donor offer - yes.  Patient educated on HCV+ donors. Belinda is willing to accept HCV+ donor offer - yes  Patient is a candidate for KDPI > 85 kidney donor offer - no.due to weight  Final determination of transplant candidacy will be made once workup is complete and  reviewed by the selection committee.    Patient advised that it is recommended that all transplant candidates, and their close contacts and household members receive Covid vaccination.    Viv Caban NP       Counseling:  Exercise: reminded patient of the importance of regular exercise for weight management, blood sugar and blood pressure management.  I also explained exercise has been shown to improve cardiovascular health, energy level, and sleep hygiene.  Lastly, I advised her that cardiovascular complications are leading cause of death for renal transplant recipients, and regular exercise can help lower this risk.    We discussed various aspects of kidney transplantation including transplant surgery, immunosuppressive medications and the need for close follow up. We also discussed side effects of immunosuppression including weight gain, hypertension, hyperlipidemia, new-onset diabetes after transplantation, infections and malignancies, especially skin cancers and lymphomas. I also reviewed the risk of acute rejection, vascular thrombosis, recurrent disease and potential transmission of infections such as hepatitis and HIV. I informed the patient that the average time on the wait list in the [facility] is between 5 to 7 years.       UNOS Patient Status  Functional Status: 60% - Requires occasional assistance but is able to care for needs  Physical Capacity: No Limitations

## 2022-12-29 NOTE — PROGRESS NOTES
Transplant Surgery  Kidney Transplant Recipient Evaluation    Referring Physician: Yanick Segura  Current Nephrologist: Yanick Segura    Subjective:     Reason for Visit: evaluate transplant candidacy    History of Present Illness: Belinda Kelly is a 67 y.o. year old female undergoing transplant evaluation.    Dialysis History: Belinda is on hemodialysis.      Transplant History: N/A    Etiology of Renal Disease: Diabetes Mellitus - Type II (based on medical records from referral).    External provider notes reviewed: Yes    Review of Systems  Objective:     Physical Exam:  Seated in wheelchair with ortho boot on right foot. Unable to rise to standing position.  Constitutional:   Vitals reviewed: yes   Well-nourished and well-groomed: yes  Eyes:   Sclerae icteric: no   Extraocular movements intact: yes  GI:    Bowel sounds normal: yes   Tenderness: no    If yes, quadrant/location: not applicable   Palpable masses: no    If yes, quadrant/location: not applicable   Hepatosplenomegaly: no   Ascites: no   Hernia: no    If yes, type/location: not applicable   Surgical scars: no    If yes, type/location: not applicable   Extremely obese abdomen with very large pannus  Resp:   Effort normal: yes   Breath sounds normal: yes    CV:   Regular rate and rhythm: yes   Heart sounds normal: yes   Femoral pulses normal: yes unable to assess due to obesity and inability to stand.   Extremities edematous: no  Skin:   Rashes or lesions present: no    If yes, describe:not applicable   Jaundice:: no    Musculoskeletal:   Gait normal: yes   Strength normal: yes  Psych:   Oriented to person, place, and time: yes   Affect and mood normal: yes    Additional comments:  unable to walk     Diagnostics:  The following labs have been reviewed: CBC  CMP  The following radiology images have been independently reviewed and interpreted: Pelvic Xray    Counseling: We provided Belinda Kelly with a group education session today.  We discussed kidney  transplantation at length with her, including risks, potential complications, and alternatives in the management of her renal failure.  The discussion included complications related to anesthesia, bleeding, infection, primary nonfunction, and ATN.  I discussed the typical postoperative course, length of hospitalization, the need for long-term immunosuppression, and the need for long-term routine follow-up.  I discussed living-donor and -donor transplantation and the relative advantages and disadvantages of each.  I also discussed average waiting times for both living donation and  donation.  I discussed national and center-specific survival rates.  I also mentioned the potential benefit of multicenter listing to candidates listed with centers within more than one organ procurement organization.  All questions were answered.    Patient advised that it is recommended that all transplant candidates, and their close contacts and household members receive Covid vaccination.    Final determination of transplant candidacy will be made once evaluation is complete and reviewed by the Kidney & Kidney/Pancreas Selection Committee.    Coronavirus disease (COVID-19) caused by severe acute respiratory virus coronavirus 2 (SARS-C0V 2) is associated with increased mortality in solid organ transplant recipients (SOT) compared to non-transplant patients. Vaccine responses to vaccination are depressed against SARS-CoV2 compared to normal individuals but improve with third vaccination doses. Vaccination prior to SOT provides both the best opportunity for transplant candidates to develop protective immunity and to reduce the risk of serious COVID19 infections post transplantation. Organ transplant candidates at [facility] will be required to receive SARS-CoV-2 vaccination prior to being listed with a an active status, whenever possible. Exceptions will be made for disability related  reasons or for sincerely held Denominational beliefs.          Transplant Surgery - Candidacy   Assessment/Plan:   Belinda Kelly has end stage renal disease (ESRD) on dialysis. I have concerns with her severe obesity, poor functional status, and cPRA 99%..     With this degree of sensitization and her severe comorbidity, I do not think that there is any realistic possibility she will ever be transplanted, and that continuing her evaluation would just be giving her false hope.    Based on available information, Belinda Kelly is an unacceptable kidney transplant candidate.     Additional testing to be completed according to the Written Order Guidelines for Adult Pre-kidney and Pancreas Transplant Evaluation (KI-02).  Interpretation of tests and discussion of patient management involves all members of the multidisciplinary transplant team.    Caesar Wisdom MD

## 2022-12-29 NOTE — PROGRESS NOTES
PHARM.D. PRE-TRANSPLANT NOTE:    This patient's medication therapy was evaluated as part of her pre-transplant evaluation.      The following general pharmacologic concerns were noted: reports taking BP medications once daily, despite being prescribed BID/TID; reports taking Renvela only at dialysis.  Concern for lack of health literacy;    The following concerns for post-operative pain management were noted: takes oxycodone, which should be considered for post-op pain managment    The following pharmacologic concerns related to HCV therapy were noted: none      This patient's medication profile was reviewed for considerations for DAA Hepatitis C therapy:    [x]  No current inducers of CYP 3A4 or PGP  [x  No amiodarone on this patient's EMR profile in the last 24 months  [x]  No past or current atrial fibrillation on this patient's EMR profile       Current Outpatient Medications   Medication Sig Dispense Refill    allopurinoL (ZYLOPRIM) 100 MG tablet Take 100 mg by mouth once daily.      BASAGLAR KWIKPEN U-100 INSULIN glargine 100 units/mL SubQ pen Inject 18 Units into the skin every evening.      ciprofloxacin HCl (CIPRO) 500 MG tablet Take 500 mg by mouth 2 (two) times daily.      cloNIDine (CATAPRES) 0.1 MG tablet Take 0.1 mg by mouth once daily.      hydrALAZINE (APRESOLINE) 100 MG tablet Take 100 mg by mouth once daily.      ibuprofen (ADVIL,MOTRIN) 800 MG tablet Take 800 mg by mouth as needed.      insulin glargine (LANTUS) 100 unit/mL injection Inject 40 Units into the skin every evening.       minoxidiL (LONITEN) 2.5 MG tablet Take 2.5 mg by mouth once daily.      NIFEdipine (ADALAT CC) 90 MG TbSR Take 90 mg by mouth once daily.      oxyCODONE-acetaminophen (PERCOCET) 7.5-325 mg per tablet Take 1 tablet by mouth as needed.      pantoprazole (PROTONIX) 40 MG tablet Take 40 mg by mouth once daily.      phenazopyridine (PYRIDIUM) 200 MG tablet Take 200 mg by mouth 3 (three) times daily.      RENVELA 800 mg Tab  Take 2,400 mg by mouth every Mon, Wed, Fri.      SYMBICORT 160-4.5 mcg/actuation HFAA Inhale 1 puff into the lungs as needed.      VENTOLIN HFA 90 mcg/actuation inhaler INHALE 2 PUFFS PO Q 6 H PRN  1    lactulose (CHRONULAC) 10 gram/15 mL solution Take 30 mLs by mouth as needed.       Current Facility-Administered Medications   Medication Dose Route Frequency Provider Last Rate Last Admin    perflutren protein-A microsphr 0.22 mg/mL IV susp  1 mL Intravenous Once Moni Deng MD               I am available for consultation and can be contacted, as needed by the other members of the Transplant team.

## 2022-12-29 NOTE — Clinical Note
December 30, 2022        Yanick Segura  [address]  [address]  [place] [address]  Phone: [phone]  Fax: [phone]             Júnior Peterson- Transplant [facility]  [address]  [facility] [address]  Phone: [phone]   Patient: Belinda Kelly   MR Number: [id number]   YOB: 1955   Date of Visit: 12/29/2022       Dear Dr. Yanick Segura    Thank you for referring Belinda Kelly to me for evaluation. Attached you will find relevant portions of my assessment and plan of care.    If you have questions, please do not hesitate to call me. I look forward to following Belinda Kelly along with you.    Sincerely,    Viv Caban, NP    Enclosure    If you would like to receive this communication electronically, please contact [email or web address] or [phone] to request [employer] Link access.    [employer] Link is a tool which provides read-only access to select patient information with whom you have a relationship. Its easy to use and provides real time access to review your patients record including encounter summaries, notes, results, and demographic information.    If you feel you have received this communication in error or would no longer like to receive these types of communications, please e-mail [email or web address]

## 2022-12-29 NOTE — PROGRESS NOTES
Transplant Recipient Adult Psychosocial Assessment    Belinda Kelly  [address] [place] [address]  Telephone Information:   Mobile [phone]   Mobile [phone]   Home  [phone] (home)  Work  There is no work phone number on file.  E-mail  No e-mail address on record    Sex: female  YOB: 1955  Age: 67 y.o.    Encounter Date: 2022  [place] Citizen: yes  Primary Language: English   Needed: no    Emergency Contact:  Name: Jovita Kelly  Relationship: daughter  Address: Same as pt  Phone Numbers:  [phone] (home), [phone] (mobile)    Family/Social Support:   Number of dependents/: Pt denies  Marital history: Pt reports never been  and currently single.  Other family dynamics: pt reports both parents are . Pt reports 2 adult daughters that live with pt.    Household Composition:  Name: Patricia Kelly  Age: 33  Relationship: daughter  Does person drive? yes    Name: Trent kelly  Age: 40  Relationship: daughter  Does person drive? yes    Name: Quinton [place]  Age: 21  Relationship: grandson  Does person drive? yes    Do you and your caregivers have access to reliable transportation? yes  PRIMARY CAREGIVER: Pt's daughters will share caregiving duties- Jovita Kelly ([phone]) and Trent Kelly ([phone])     Able to take time off work without financial concerns: yes. Pt's daughter, Jovita, reports she is a  at [employer] and has PTO, FMLA, and STD benefits.     Additional Significant Others who will Assist with Transplant:  See above.    Living Will: no .  Healthcare Power of : no  Advance Directives on file: <Not Received per medical record.  Verbally reviewed LW/HCPA information.   provided patient with copy of LW/HCPA documents and provided education on completion of forms    Living Donors: No. Education and resource information given to patient.    Highest Education Level: High School (9-12) or  "GED  Reading Ability: 12th grade  Reports difficulty with: seeing- wears Rx glasses  Learns Best By:  hands-on, practice     Status: no  VA Benefits: no     Working for Income: No, pt reports last worked as  in an [employer] in .   If no, reason not working: Disability  Spouse/Significant Other Employment: Pt is single.    Disabled: yes: date disability began: , due to: "vision".    Monthly Income:  $964- SS senior care  $186- SNAP/food stamps      Insurance:   Payer/Plan Subscr  Sex Relation Sub. Ins. ID Effective Group Num   1. MEDICARE - ME* JENNIFER YUSUF 1955 Female Self [id number]                                    [address]   2. [place]ID [place]* JENN[place] YUSUF 1955 Female Self [id number]* [id number]                                    [address] [address]     Primary Insurance (for UNOS reporting): Public Insurance - Medicare & Choice  Secondary Insurance (for UNOS reporting): Public Insurance - Medicaid    Dialysis Adherence: Patient reports   Infusion Service: patient utilizing? no  Home Health: patient utilizing? no  DME: yes, BPC, CPAP (does not use because was not compliant)  Pulmonary/Cardiac Rehab: Pt denies   ADLS:  Pt reports independent with all ADLS and handles own medication management. Pt reports not currently driving due to having a boot on leg at the moment.     Adherence:    Pt reports is highly compliant with all medical appointments and instructions. Pt also reports non-compliant with CPAP machine and may have to return CPAP in new year.  Adherence education and counseling provided.     Per History Section:  Past Medical History:   Diagnosis Date    Anemia     Chronic kidney disease-mineral and bone disorder     CKD (chronic kidney disease) stage 4, GFR 15-29 ml/min     CKD (chronic kidney disease) stage 5, GFR less than 15 ml/min     Diabetes mellitus     Diabetic nephropathy     Disorder of kidney and ureter     Edema     Encounter for blood transfusion  " "   Hypertension     Hyperuricemia     Hypoalbuminemia     Hypocalcemia     Nephrotic syndrome     Protein malnutrition     Proteinuria     Secondary hyperparathyroidism     Secondary hyperparathyroidism of renal origin     Vitamin D deficiency      Social History     Tobacco Use    Smoking status: Never    Smokeless tobacco: Never   Substance Use Topics    Alcohol use: No     Social History     Substance and Sexual Activity   Drug Use No     Social History     Substance and Sexual Activity   Sexual Activity Not Currently       Per Today's Psychosocial:  Tobacco: none, patient denies any use.  Alcohol: none, patient denies any use.Pt reports stopped drinking in 2005.  Illicit Drugs/Non-prescribed Medications: none, patient denies any use.      Arrests/DWI/Treatment/Rehab: patient denies    Psychiatric History:    Mental Health: Pt reports previous period of depression in 2005 when pt became sick.   In beginning of interview pt reported to SW pt was upset and pt's daughter reports pt "had her feelings hurt" by the surgeon. Pt told SW the surgeon informed pt to lose weight and become more active. SW encouraged pt to follow all medical advice and remain positive. Pt denied need for mental health referral at this time.   Psychiatrist/Counselor: Pt denies ever seeing mental health professional and denies need for referral. Pt reports open to meeting with Psychiatry if the transplant team recommend.    Medications:  Pt denies  Suicide/Homicide Issues: Pt denies history or current SI/HI.      Knowledge: Patient states having clear understanding and realistic expectations regarding the potential risks and potential benefits of organ transplantation and organ donation and agrees to discuss with health care team members and support system members, as well as to utilize available resources and express questions and/or concerns in order to further facilitate the pt informed decision-making.  Resources and information provided " and reviewed.     Patient is aware of [facility] affiliation and/or partnership with agencies in home health care, LTAC, SNF, [facility], and other hospitals and clinics.    Understanding: Patient reports having a clear understanding of the many lifetime commitments involved with being a transplant recipient, including costs, compliance, medications, lab work, procedures, appointments, concrete and financial planning, preparedness, timely and appropriate communication of concerns, abstinence (ETOH, tobacco, illicit non-prescribed drugs), adherence to all health care team recommendations, support system and caregiver involvement, appropriate and timely resource utilization and follow-through, mental health counseling as needed/recommended, and patient and caregiver responsibilities.  Social Service Handbook, resources and detailed educational information provided and reviewed.  Educational information provided.    Patient also reports current and expected compliance with health care regime, and patient states having a clear understanding of the importance of compliance.  Patient reports a clear understanding that risks and benefits may be involved with organ transplantation and with organ donation.  Patient also reports clear understanding that psychosocial risk factors may affect patient, and include but are not limited to feelings of depression, generalized anxiety, anxiety regarding dependence on others, post traumatic stress disorder, feelings of guilt and other emotional and/or mental concerns, and/or exacerbation of existing mental health concerns.  Detailed resources provided and discussed.  Patient agrees to access appropriate resources in a timely manner as needed and/or as recommended, and to communicate concerns appropriately.  Patient also reports a clear understanding of treatment options available.      Patient and caregiver received education in a group setting.   reviewed education, provided  additional information, and answered questions.    Feelings or Concerns: Pt reports concerns about being suitable for transplant due to being in a boot and not active currently.     Coping: Identify Patient & Caregiver Strategies to [place]:   1. Currently & Pre-transplant - [place] Limon, family, bingo, casino   2. At the time of surgery - family, [place]   3. During post-Transplant & Recovery Period - family, [facility]    Goals: Pt reports goal of going through with transplant.  Patient referred to Vocational Rehabilitation.    Interview Behavior: Patient and caregiver present as alert and oriented x 4, pleasant, good eye contact, well groomed, recall good, concentration/judgement good, average intelligence, calm, communicative, cooperative, and asking and answering questions appropriately.  Pt's daughter, Jovita, present for duration of interview with pt's permission.          Transplant Social Work - Candidacy  Assessment/Plan:     Psychosocial Suitability: Based on psychosocial risk factors, patient presents as Low risk for kidney transplant due to established caregiver plan, supportive family system, no reported history of substance use, adequate insurance coverage and personal finances to cover transplant costs, and realistic beliefs and expectations regarding risks and benefits of transplant. Pt reports having large family in [place] [facility] [place] [facility] and plans to stay with family posttransplant.     Recommendations/Additional Comments: SW recommends pt establish local lodging plan for immediate posttransplant recovery period. Sw recommends pt conduct fundraising to assist pt with pay for cost of medications, food, gas, and other transplant related needs. SW recommends pt remain aware of potential mental health concerns and contact team if any concerns arise. SW recommends pt remain abstinent from tobacco, ETOH, and substance use. SW supports pt's continued adherence. SW remains available to answer any  questions or concerns that arise as pt moves through transplant process.     Rubi Espino LMSW

## 2022-12-29 NOTE — PROGRESS NOTES
INITIAL PATIENT EDUCATION NOTE    Ms. Belinda Kelly was seen in pre-kidney transplant clinic for evaluation for kidney, kidney/pancreas or pancreas only transplant.  The patient attended a an individual video education session that discussed/reviewed the following aspects of transplantation: evaluation including diagnostic and laboratory testing,( Chemistries, Hematology, Serologies including HIV and Hepatitis and HLA) required for transplantation and selection committee process, UNOS waitlist management/multiple listings, types of organs offered (KDPI < 85%, KDPI > 85%, PHS risk, DCD, HCV+, HIV+ for HIV+ recipients and enbloc/dual), financial aspects, surgical procedures, dietary instruction pre- and post-transplant, health maintenance pre- and post-transplant, post-transplant hospitalization and outpatient follow-up, potential to participate in a research protocol, and medication management and side effects.  A question and answer session was provided after the presentation.    The patient was seen by all members of the multi-disciplinary team to include: Nephrologist/TERRIE, Surgeon, , Transplant Coordinator, , Pharmacist and Dietician (if applicable).    The patient reviewed and signed all consents for evaluation which were witnessed and sent to scanning into the [facility] chart.    The patient was given an education book and plan for further evaluation based on her individual assessment.      Reviewed program requirement for complete COVID vaccination with documentation prior to listing.  COVID education information reviewed with patient. Patient encouraged to be up to date on all vaccinations.       The patient was informed that the transplant team would manage immediate post op pain. If the patient requires long term pain management, they will need to have that pain management addressed by their PCP or previous provider who wrote for long term pain medicines.    The patient was  encouraged to call with any questions or concerns.

## 2022-12-29 NOTE — TELEPHONE ENCOUNTER
Reviewed pt transplant labs.  Notified dialysis unit dietitian of the following abnormal labs via fax and requested their most recent nutrition note on this pt.  Once this note is received it will be scanned into pt's chart.      K 3.4  Glucose 133

## 2022-12-30 ENCOUNTER — TELEPHONE (OUTPATIENT)
Dept: TRANSPLANT | Facility: CLINIC | Age: 67
End: 2022-12-30
Payer: MEDICARE

## 2022-12-30 LAB
RPR SER QL: NORMAL
STRONGYLOIDES ANTIBODY IGG: NEGATIVE

## 2022-12-30 NOTE — TELEPHONE ENCOUNTER
MA notes per Jennifer(HAKEEM) with [facility]    FOR THE PAST THREE MONTHS:    0-AMA's  0-No-shows    No concerns with care giving, transportation, or mental health    Per Jennifer (HAKEEM) she is very compliant and very motivated.     Gretchen Nunn  Abdominal Transplant MA   negative

## 2022-12-31 LAB
VARICELLA INTERPRETATION: POSITIVE
VARICELLA ZOSTER IGG: 4.85 ISR (ref 0–0.9)

## 2023-01-03 LAB
HBV SURFACE AB SER QL IA: POSITIVE
HBV SURFACE AB SERPL IA-ACNC: 712 MIU/ML

## 2023-01-04 LAB
GAMMA INTERFERON BACKGROUND BLD IA-ACNC: 0.02 IU/ML
M TB IFN-G BLD-IMP: NEGATIVE
M TB IFN-G CD4+ BCKGRND COR BLD-ACNC: 0.01 IU/ML
M TB IFN-G CD4+CD8+ BCKGRND COR BLD-ACNC: 0 IU/ML
MITOGEN IGNF BCKGRD COR BLD-ACNC: 9.98 IU/ML

## 2023-01-06 ENCOUNTER — COMMITTEE REVIEW (OUTPATIENT)
Dept: TRANSPLANT | Facility: CLINIC | Age: 68
End: 2023-01-06
Payer: MEDICARE

## 2023-01-06 NOTE — LETTER
January 6, 2023    Belinda Kelly  [address] [facility] [place] [address]    Dear Belinda Kelly:  MRN: [id number]    It is the duty of the [facility] Selection [facility] to determine which patients are candidates for a transplant. For this reason, our committee has the difficult task of evaluating patients to determine which ones have the greatest chance of having a successful transplant. We are aware of the magnitude of this responsibility, and we approach it with reverence and humility.    It is with regret I inform you that you are not approved as a transplant candidate due to severe obesity, poor functional status, high PRA, and multiple comorbilities. Based on this review, we have determined that at this time, you are not a candidate for a transplant at [facility].      The selection committee carefully considers each patient's transplant candidacy and determines whether it is safe to proceed with transplantation on a case-by-case basis using established selection criteria.  At present, the risk of proceeding with an elective transplant surgery has become too high.                                                                               Although the selection committee believes you are not a suitable transplant candidate, you have the option to be evaluated at other transplant centers who may have different selection criteria.  You may request your [facility] records be sent to any center of your choice by contacting our Medical Records Department at [phone].                                                                               Attached is a letter from the [employer]aring ([employer]).  It describes the services and information offered to patients by UNOS and the Organ Procurement and Transplant Network.    The [facility] sincerely wishes you the best and remains available to answer any questions.  Please do not hesitate to contact our pre-transplant  office if we can assist you in any other way.                                                                               Sincerely,      Xiomara Blackburn MD  Medical Director, Kidney & Kidney/Pancreas Transplantation    Cc:   Dr. Yanick Segura          [facility]    Encl: UNOS Letter               The Organ Procurement and Transplantation Network   Toll-free patient services line: Your resource for organ transplant information     If you have a question regarding your own medical care, you always should call your transplant hospital first. However, for general organ transplant-related information, you can call the Organ Procurement and Transplantation Network (OPTN) toll-free patient services line at [phone].     Anyone, including potential transplant candidates, candidates, recipients, family members, friends, living donors, and donor family members, can call this number to:     Talk about organ donation, living donation, the transplant process, the donation process, and transplant policies.   Get a free patient information kit with helpful booklets, waiting list and transplant information, and a list of all transplant hospitals.   Ask questions about the OPTN website (https://optn.transplant.hrsa.gov/), the [employer]s (UNOS) website (https://unos.org/), or the UNOS website for living donors and transplant recipients. (https://www.transplantliving.org/).   Learn how the OPTN can help you.   Talk about any concerns that you may have with a transplant hospital.     The nations transplant system, the OPTN, is managed under federal contract by the [employer] (UNOS), which is a non-profit charitable organization. The OPTN helps create and define organ sharing policies that make the best use of donated organs. This process continuously evaluating new advances and discoveries so policies can be adapted to best serve patients  waiting for transplants. To do so, the OPTN works closely with transplant professionals, transplant patients, transplant candidates, donor families, living donors, and the public. All transplant programs and organ procurement organizations throughout the country are OPTN members and are obligated to follow the policies the OPTN creates for allocating organs.     The OPTN also is responsible for:   Providing educational material for patients, the public, and professionals.   Raising awareness of the need for donated organs and tissue.   Coordinating organ procurement, matching, and placement.   Collecting information about every organ transplant and donation that occurs in the [place].     Remember, you should contact your transplant hospital directly if you have questions or concerns about your own medical care including medical records, work-up progress, and test results.     We are not your transplant hospital, and our staff will not be able to answer questions about your case, so please keep your transplant hospitals phone number handy.   However, while you research your transplant needs and learn as much as you can about transplantation and donation, we welcome your call to our toll-free patient services line at [phone].

## 2023-01-06 NOTE — COMMITTEE REVIEW
Native Organ Dx: Diabetes Mellitus - Type II      Not approved for LRD/CAD transplant due to severe obesity, poor functional status, high PRA, multiple comorbilities. NOT a candidate in the future.       Note written by: Michelle Abadie, RN     ===============================================  I was present at the meeting and attest to the decision of the selection committee    Marta Quarles DO  Transplant Nephrology

## 2023-01-10 ENCOUNTER — DOCUMENTATION ONLY (OUTPATIENT)
Dept: TRANSPLANT | Facility: CLINIC | Age: 68
End: 2023-01-10
Payer: MEDICARE

## 2023-01-10 NOTE — PROGRESS NOTES
Nutritional assessment from dialysis unit received and reviewed--no nutritional changes to plan needed at this time.  Pt to continue to follow-up with renal dietitians recommendations.

## 2025-04-16 NOTE — LETTER
September 3, 2020        Yanick Segura  [address] [facility]  [address]  [place] [address]  Phone: [phone]  Fax: [phone]             [facility]- Transplant 1st Fl  [address]  [facility] [address]  Phone: [phone]   Patient: Belinda Kelly   MR Number: [id number]   YOB: 1955   Date of Visit: 9/1/2020       Dear Dr. Yanick Segura    Thank you for referring Belinda Kelly to me for evaluation. Attached you will find relevant portions of my assessment and plan of care.    If you have questions, please do not hesitate to call me. I look forward to following Belinda Kelly along with you.    Sincerely,    Viv Caban NP    Enclosure    If you would like to receive this communication electronically, please contact [email or web address] or [phone] to request [employer] access.    [employer] is a tool which provides read-only access to select patient information with whom you have a relationship. Its easy to use and provides real time access to review your patients record including encounter summaries, notes, results, and demographic information.    If you feel you have received this communication in error or would no longer like to receive these types of communications, please e-mail [email or web address]        Surgeon (Optional): thomas márquez pa-c Biopsy Photograph Reviewed: Yes Accession #: [id number] Pathology Comment (Optional): Excision BCC Date Of Previous Biopsy (Optional): 04/07/2025 Size Of Lesion In Cm: 1.3 X Size Of Lesion In Cm (Optional): 1.6 Size Of Margin In Cm: 0.3 Anesthesia Volume In Cc: 3.1 Was An Eye Clamp Used?: No Eye Clamp Note Details: An eye clamp was used during the procedure. Excision Method: Elliptical Saucerization Depth: dermis and superficial adipose tissue Repair Type: Intermediate Intermediate / Complex Repair - Final Wound Length In Cm: 5 Deep Sutures: 4-0 Vicryl Epidermal Sutures: 4-0 Prolene Suture Removal: 14 days Suturegard Retention Suture: 2-0 Nylon Retention Suture Bite Size: 3 mm Length To Time In Minutes Device Was In Place: 10 Number Of Hemigard Strips Per Side: 1 Undermining Type: Entire Wound Debridement Text: The wound edges were debrided prior to proceeding with the closure to facilitate wound healing. Helical Rim Text: The closure involved the helical rim. Vermilion Border Text: The closure involved the vermilion border. Nostril Rim Text: The closure involved the nostril rim. Retention Suture Text: Retention sutures were placed to support the closure and prevent dehiscence. Primary Defect Length (In Cm): 0 Lab: [id number] Lab Facility: [id number] Graft Donor Site Bandage (Optional-Leave Blank If You Don't Want In Note): Steri-strips and a pressure bandage were applied to the donor site. Epidermal Closure Graft Donor Site (Optional): simple interrupted Billing Type: Third-Party Bill Excision Depth: adipose tissue Scalpel Size: 15 blade Anesthesia Type: 1% lidocaine with epinephrine Hemostasis: Electrocautery Estimated Blood Loss (Cc): minimal Detail Level: Detailed Repair Depth: use same depth as excision depth Anesthesia Volume In Cc: 6 Epidermal Closure: running horizontal mattress Wound Care: Mupirocin Dressing: dry sterile dressing Suturegard Intro: Intraoperative tissue expansion was performed, utilizing the SUTUREGARD device, in order to reduce wound tension. Suturegard Body: The suture ends were repeatedly re-tightened and re-clamped to achieve the desired tissue expansion. Hemigard Intro: Due to skin fragility and wound tension, it was decided to use HEMIGARD adhesive retention suture devices to permit a linear closure. The skin was cleaned and dried for a 6cm distance away from the wound. Excessive hair, if present, was removed to allow for adhesion. Hemigard Postcare Instructions: The HEMIGARD strips are to remain completely dry for at least 5-7 days. Positioning (Leave Blank If You Do Not Want): The patient was placed in a comfortable position exposing the surgical site. Pre-Excision Curettage Text (Leave Blank If You Do Not Want): Prior to drawing the surgical margin the visible lesion was removed with curettage to clearly define the lesion size. Complex Repair Preamble Text (Leave Blank If You Do Not Want): Extensive wide undermining was performed. Intermediate Repair Preamble Text (Leave Blank If You Do Not Want): Undermining was performed with blunt dissection. Curvilinear Excision Additional Text (Leave Blank If You Do Not Want): The margin was drawn around the clinically apparent lesion.  A curvilinear shape was then drawn on the skin incorporating the lesion and margins.  Incisions were then made along these lines to the appropriate tissue plane and the lesion was extirpated. Fusiform Excision Additional Text (Leave Blank If You Do Not Want): The margin was drawn around the clinically apparent lesion.  A fusiform shape was then drawn on the skin incorporating the lesion and margins.  Incisions were then made along these lines to the appropriate tissue plane and the lesion was extirpated. Elliptical Excision Additional Text (Leave Blank If You Do Not Want): The margin was drawn around the clinically apparent lesion.  An elliptical shape was then drawn on the skin incorporating the lesion and margins.  Incisions were then made along these lines to the appropriate tissue plane and the lesion was extirpated. Saucerization Excision Additional Text (Leave Blank If You Do Not Want): The margin was drawn around the clinically apparent lesion.  Incisions were then made along these lines, in a tangential fashion, to the appropriate tissue plane and the lesion was extirpated. Slit Excision Additional Text (Leave Blank If You Do Not Want): A linear line was drawn on the skin overlying the lesion. An incision was made slowly until the lesion was visualized.  Once visualized, the lesion was removed with blunt dissection. Excisional Biopsy Additional Text (Leave Blank If You Do Not Want): The margin was drawn around the clinically apparent lesion. An elliptical shape was then drawn on the skin incorporating the lesion and margins.  Incisions were then made along these lines to the appropriate tissue plane and the lesion was extirpated. Perilesional Excision Additional Text (Leave Blank If You Do Not Want): The margin was drawn around the clinically apparent lesion. Incisions were then made along these lines to the appropriate tissue plane and the lesion was extirpated. Repair Performed By Another Provider Text (Leave Blank If You Do Not Want): After the tissue was excised the defect was repaired by another provider. No Repair - Repaired With Adjacent Surgical Defect Text (Leave Blank If You Do Not Want): After the excision the defect was repaired concurrently with another surgical defect which was in close approximation. Adjacent Tissue Transfer Text: The defect edges were debeveled with a #15 scalpel blade. Given the location of the defect and the proximity to free margins an adjacent tissue transfer was deemed most appropriate. Using a sterile surgical marker, an appropriate flap was drawn incorporating the defect and placing the expected incisions within the relaxed skin tension lines where possible. The area thus outlined was incised deep to adipose tissue with a #15 scalpel blade. The skin margins were undermined to an appropriate distance in all directions utilizing iris scissors and carried over to close the primary defect. Advancement Flap (Single) Text: The defect edges were debeveled with a #15 scalpel blade. Given the location of the defect and the proximity to free margins a single advancement flap was deemed most appropriate. Using a sterile surgical marker, an appropriate advancement flap was drawn incorporating the defect and placing the expected incisions within the relaxed skin tension lines where possible. The area thus outlined was incised deep to adipose tissue with a #15 scalpel blade. The skin margins were undermined to an appropriate distance in all directions utilizing iris scissors. Following this, the designed flap was advanced and carried over into the primary defect and sutured into place. Advancement Flap (Double) Text: The defect edges were debeveled with a #15 scalpel blade. Given the location of the defect and the proximity to free margins a double advancement flap was deemed most appropriate. Using a sterile surgical marker, the appropriate advancement flaps were drawn incorporating the defect and placing the expected incisions within the relaxed skin tension lines where possible. The area thus outlined was incised deep to adipose tissue with a #15 scalpel blade. The skin margins were undermined to an appropriate distance in all directions utilizing iris scissors. Following this, the designed flaps were advanced and carried over into the primary defect and sutured into place. Burow's Advancement Flap Text: The defect edges were debeveled with a #15 scalpel blade. Given the location of the defect and the proximity to free margins a Burow's advancement flap was deemed most appropriate. Using a sterile surgical marker, the appropriate advancement flap was drawn incorporating the defect and placing the expected incisions within the relaxed skin tension lines where possible. The area thus outlined was incised deep to adipose tissue with a #15 scalpel blade. The skin margins were undermined to an appropriate distance in all directions utilizing iris scissors. Following this, the designed flap was advanced and carried over into the primary defect and sutured into place. Chonodrocutaneous Helical Advancement Flap Text: The defect edges were debeveled with a #15 scalpel blade. Given the location of the defect and the proximity to free margins a chondrocutaneous helical advancement flap was deemed most appropriate. Using a sterile surgical marker, the appropriate advancement flap was drawn incorporating the defect and placing the expected incisions within the relaxed skin tension lines where possible. The area thus outlined was incised deep to adipose tissue with a #15 scalpel blade. The skin margins were undermined to an appropriate distance in all directions utilizing iris scissors. Following this, the designed flap was advanced and carried over into the primary defect and sutured into place. Crescentic Advancement Flap Text: The defect edges were debeveled with a #15 scalpel blade. Given the location of the defect and the proximity to free margins a crescentic advancement flap was deemed most appropriate. Using a sterile surgical marker, the appropriate advancement flap was drawn incorporating the defect and placing the expected incisions within the relaxed skin tension lines where possible. The area thus outlined was incised deep to adipose tissue with a #15 scalpel blade. The skin margins were undermined to an appropriate distance in all directions utilizing iris scissors. Following this, the designed flap was advanced and carried over into the primary defect and sutured into place. A-T Advancement Flap Text: The defect edges were debeveled with a #15 scalpel blade. Given the location of the defect, shape of the defect and the proximity to free margins an A-T advancement flap was deemed most appropriate. Using a sterile surgical marker, an appropriate advancement flap was drawn incorporating the defect and placing the expected incisions within the relaxed skin tension lines where possible. The area thus outlined was incised deep to adipose tissue with a #15 scalpel blade. The skin margins were undermined to an appropriate distance in all directions utilizing iris scissors. Following this, the designed flap was advanced and carried over into the primary defect and sutured into place. O-T Advancement Flap Text: The defect edges were debeveled with a #15 scalpel blade. Given the location of the defect, shape of the defect and the proximity to free margins an O-T advancement flap was deemed most appropriate. Using a sterile surgical marker, an appropriate advancement flap was drawn incorporating the defect and placing the expected incisions within the relaxed skin tension lines where possible. The area thus outlined was incised deep to adipose tissue with a #15 scalpel blade. The skin margins were undermined to an appropriate distance in all directions utilizing iris scissors. Following this, the designed flap was advanced and carried over into the primary defect and sutured into place. O-L Flap Text: The defect edges were debeveled with a #15 scalpel blade. Given the location of the defect, shape of the defect and the proximity to free margins an O-L flap was deemed most appropriate. Using a sterile surgical marker, an appropriate advancement flap was drawn incorporating the defect and placing the expected incisions within the relaxed skin tension lines where possible. The area thus outlined was incised deep to adipose tissue with a #15 scalpel blade. The skin margins were undermined to an appropriate distance in all directions utilizing iris scissors. Following this, the designed flap was advanced and carried over into the primary defect and sutured into place. O-Z Flap Text: The defect edges were debeveled with a #15 scalpel blade. Given the location of the defect, shape of the defect and the proximity to free margins an O-Z flap was deemed most appropriate. Using a sterile surgical marker, an appropriate transposition flap was drawn incorporating the defect and placing the expected incisions within the relaxed skin tension lines where possible. The area thus outlined was incised deep to adipose tissue with a #15 scalpel blade. The skin margins were undermined to an appropriate distance in all directions utilizing iris scissors. Following this, the designed flap was carried over into the primary defect and sutured into place. Double O-Z Flap Text: The defect edges were debeveled with a #15 scalpel blade. Given the location of the defect, shape of the defect and the proximity to free margins a Double O-Z flap was deemed most appropriate. Using a sterile surgical marker, an appropriate transposition flap was drawn incorporating the defect and placing the expected incisions within the relaxed skin tension lines where possible. The area thus outlined was incised deep to adipose tissue with a #15 scalpel blade. The skin margins were undermined to an appropriate distance in all directions utilizing iris scissors. Following this, the designed flap was carried over into the primary defect and sutured into place. V-Y Flap Text: The defect edges were debeveled with a #15 scalpel blade. Given the location of the defect, shape of the defect and the proximity to free margins a V-Y flap was deemed most appropriate. Using a sterile surgical marker, an appropriate advancement flap was drawn incorporating the defect and placing the expected incisions within the relaxed skin tension lines where possible. The area thus outlined was incised deep to adipose tissue with a #15 scalpel blade. The skin margins were undermined to an appropriate distance in all directions utilizing iris scissors. Following this, the designed flap was advanced and carried over into the primary defect and sutured into place. Advancement-Rotation Flap Text: The defect edges were debeveled with a #15 scalpel blade. Given the location of the defect, shape of the defect and the proximity to free margins an advancement-rotation flap was deemed most appropriate. Using a sterile surgical marker, an appropriate flap was drawn incorporating the defect and placing the expected incisions within the relaxed skin tension lines where possible. The area thus outlined was incised deep to adipose tissue with a #15 scalpel blade. The skin margins were undermined to an appropriate distance in all directions utilizing iris scissors. Following this, the designed flap was carried over into the primary defect and sutured into place. Mercedes Flap Text: The defect edges were debeveled with a #15 scalpel blade. Given the location of the defect, shape of the defect and the proximity to free margins a Mercedes flap was deemed most appropriate. Using a sterile surgical marker, an appropriate advancement flap was drawn incorporating the defect and placing the expected incisions within the relaxed skin tension lines where possible. The area thus outlined was incised deep to adipose tissue with a #15 scalpel blade. The skin margins were undermined to an appropriate distance in all directions utilizing iris scissors. Following this, the designed flap was advanced and carried over into the primary defect and sutured into place. Modified Advancement Flap Text: The defect edges were debeveled with a #15 scalpel blade. Given the location of the defect, shape of the defect and the proximity to free margins a modified advancement flap was deemed most appropriate. Using a sterile surgical marker, an appropriate advancement flap was drawn incorporating the defect and placing the expected incisions within the relaxed skin tension lines where possible. The area thus outlined was incised deep to adipose tissue with a #15 scalpel blade. The skin margins were undermined to an appropriate distance in all directions utilizing iris scissors. Following this, the designed flap was advanced and carried over into the primary defect and sutured into place. Mucosal Advancement Flap Text: Given the location of the defect, shape of the defect and the proximity to free margins a mucosal advancement flap was deemed most appropriate. Incisions were made with a 15 blade scalpel in the appropriate fashion along the cutaneous vermilion border and the mucosal lip. The remaining actinically damaged mucosal tissue was excised.  The mucosal advancement flap was then elevated to the gingival sulcus with care taken to preserve the neurovascular structures and advanced into the primary defect. Care was taken to ensure that precise realignment of the vermilion border was achieved. Peng Advancement Flap Text: The defect edges were debeveled with a #15 scalpel blade. Given the location of the defect, shape of the defect and the proximity to free margins a Peng advancement flap was deemed most appropriate. Using a sterile surgical marker, an appropriate advancement flap was drawn incorporating the defect and placing the expected incisions within the relaxed skin tension lines where possible. The area thus outlined was incised deep to adipose tissue with a #15 scalpel blade. The skin margins were undermined to an appropriate distance in all directions utilizing iris scissors. Following this, the designed flap was advanced and carried over into the primary defect and sutured into place. Hatchet Flap Text: The defect edges were debeveled with a #15 scalpel blade. Given the location of the defect, shape of the defect and the proximity to free margins a hatchet flap was deemed most appropriate. Using a sterile surgical marker, an appropriate hatchet flap was drawn incorporating the defect and placing the expected incisions within the relaxed skin tension lines where possible. The area thus outlined was incised deep to adipose tissue with a #15 scalpel blade. The skin margins were undermined to an appropriate distance in all directions utilizing iris scissors. Following this, the designed flap was carried over into the primary defect and sutured into place. Rotation Flap Text: The defect edges were debeveled with a #15 scalpel blade. Given the location of the defect, shape of the defect and the proximity to free margins a rotation flap was deemed most appropriate. Using a sterile surgical marker, an appropriate rotation flap was drawn incorporating the defect and placing the expected incisions within the relaxed skin tension lines where possible. The area thus outlined was incised deep to adipose tissue with a #15 scalpel blade. The skin margins were undermined to an appropriate distance in all directions utilizing iris scissors. Following this, the designed flap was carried over into the primary defect and sutured into place. Bilateral Rotation Flap Text: The defect edges were debeveled with a #15 scalpel blade. Given the location of the defect, shape of the defect and the proximity to free margins a bilateral rotation flap was deemed most appropriate. Using a sterile surgical marker, an appropriate rotation flap was drawn incorporating the defect and placing the expected incisions within the relaxed skin tension lines where possible. The area thus outlined was incised deep to adipose tissue with a #15 scalpel blade. The skin margins were undermined to an appropriate distance in all directions utilizing iris scissors. Following this, the designed flap was carried over into the primary defect and sutured into place. Spiral Flap Text: The defect edges were debeveled with a #15 scalpel blade. Given the location of the defect, shape of the defect and the proximity to free margins a spiral flap was deemed most appropriate. Using a sterile surgical marker, an appropriate rotation flap was drawn incorporating the defect and placing the expected incisions within the relaxed skin tension lines where possible. The area thus outlined was incised deep to adipose tissue with a #15 scalpel blade. The skin margins were undermined to an appropriate distance in all directions utilizing iris scissors. Following this, the designed flap was carried over into the primary defect and sutured into place. Staged Advancement Flap Text: The defect edges were debeveled with a #15 scalpel blade. Given the location of the defect, shape of the defect and the proximity to free margins a staged advancement flap was deemed most appropriate. Using a sterile surgical marker, an appropriate advancement flap was drawn incorporating the defect and placing the expected incisions within the relaxed skin tension lines where possible. The area thus outlined was incised deep to adipose tissue with a #15 scalpel blade. The skin margins were undermined to an appropriate distance in all directions utilizing iris scissors. Following this, the designed flap was carried over into the primary defect and sutured into place. Star Wedge Flap Text: The defect edges were debeveled with a #15 scalpel blade. Given the location of the defect, shape of the defect and the proximity to free margins a star wedge flap was deemed most appropriate. Using a sterile surgical marker, an appropriate rotation flap was drawn incorporating the defect and placing the expected incisions within the relaxed skin tension lines where possible. The area thus outlined was incised deep to adipose tissue with a #15 scalpel blade. The skin margins were undermined to an appropriate distance in all directions utilizing iris scissors. Following this, the designed flap was carried over into the primary defect and sutured into place. Transposition Flap Text: The defect edges were debeveled with a #15 scalpel blade. Given the location of the defect and the proximity to free margins a transposition flap was deemed most appropriate. Using a sterile surgical marker, an appropriate transposition flap was drawn incorporating the defect. The area thus outlined was incised deep to adipose tissue with a #15 scalpel blade. The skin margins were undermined to an appropriate distance in all directions utilizing iris scissors. Following this, the designed flap was carried over into the primary defect and sutured into place. Muscle Hinge Flap Text: The defect edges were debeveled with a #15 scalpel blade.  Given the size, depth and location of the defect and the proximity to free margins a muscle hinge flap was deemed most appropriate. Using a sterile surgical marker, an appropriate hinge flap was drawn incorporating the defect. The area thus outlined was incised with a #15 scalpel blade. The skin margins were undermined to an appropriate distance in all directions utilizing iris scissors. Following this, the designed flap was carried into the primary defect and sutured into place. Mustarde Flap Text: The defect edges were debeveled with a #15 scalpel blade.  Given the size, depth and location of the defect and the proximity to free margins a Mustarde flap was deemed most appropriate. Using a sterile surgical marker, an appropriate flap was drawn incorporating the defect. The area thus outlined was incised with a #15 scalpel blade. The skin margins were undermined to an appropriate distance in all directions utilizing iris scissors. Following this, the designed flap was carried into the primary defect and sutured into place. Nasal Turnover Hinge Flap Text: The defect edges were debeveled with a #15 scalpel blade.  Given the size, depth, location of the defect and the defect being full thickness a nasal turnover hinge flap was deemed most appropriate. Using a sterile surgical marker, an appropriate hinge flap was drawn incorporating the defect. The area thus outlined was incised with a #15 scalpel blade. The flap was designed to recreate the nasal mucosal lining and the alar rim. The skin margins were undermined to an appropriate distance in all directions utilizing iris scissors. Following this, the designed flap was carried over into the primary defect and sutured into place Nasalis-Muscle-Based Myocutaneous Island Pedicle Flap Text: Using a #15 blade, an incision was made around the donor flap to the level of the nasalis muscle. Wide lateral undermining was then performed in both the subcutaneous plane above the nasalis muscle, and in a submuscular plane just above periosteum. This allowed the formation of a free nasalis muscle axial pedicle (based on the angular artery) which was still attached to the actual cutaneous flap, increasing its mobility and vascular viability. Hemostasis was obtained with pinpoint electrocoagulation. The flap was mobilized into position and the pivotal anchor points positioned and stabilized with buried interrupted sutures. Subcutaneous and dermal tissues were closed in a multilayered fashion with sutures. Tissue redundancies were excised, and the epidermal edges were apposed without significant tension and sutured with sutures. Nasalis Myocutaneous Flap Text: Using a #15 blade, an incision was made around the donor flap to the level of the nasalis muscle. Wide lateral undermining was then performed in both the subcutaneous plane above the nasalis muscle, and in a submuscular plane just above periosteum. This allowed the formation of a free nasalis muscle axial pedicle which was still attached to the actual cutaneous flap, increasing its mobility and vascular viability. Hemostasis was obtained with pinpoint electrocoagulation. The flap was mobilized into position and the pivotal anchor points positioned and stabilized with buried interrupted sutures. Subcutaneous and dermal tissues were closed in a multilayered fashion with sutures. Tissue redundancies were excised, and the epidermal edges were apposed without significant tension and sutured with sutures. Nasolabial Transposition Flap Text: The defect edges were debeveled with a #15 scalpel blade.  Given the size, depth and location of the defect and the proximity to free margins a nasolabial transposition flap was deemed most appropriate. Using a sterile surgical marker, an appropriate flap was drawn incorporating the defect. The area thus outlined was incised with a #15 scalpel blade. The skin margins were undermined to an appropriate distance in all directions utilizing iris scissors. Following this, the designed flap was carried into the primary defect and sutured into place. Orbicularis Oris Muscle Flap Text: The defect edges were debeveled with a #15 scalpel blade.  Given that the defect affected the competency of the oral sphincter an orbicularis oris muscle flap was deemed most appropriate to restore this competency and normal muscle function.  Using a sterile surgical marker, an appropriate flap was drawn incorporating the defect. The area thus outlined was incised with a #15 scalpel blade. Following this, the designed flap was carried over into the primary defect and sutured into place. Melolabial Transposition Flap Text: The defect edges were debeveled with a #15 scalpel blade. Given the location of the defect and the proximity to free margins a melolabial flap was deemed most appropriate. Using a sterile surgical marker, an appropriate melolabial transposition flap was drawn incorporating the defect. The area thus outlined was incised deep to adipose tissue with a #15 scalpel blade. The skin margins were undermined to an appropriate distance in all directions utilizing iris scissors. Following this, the designed flap was carried over into the primary defect and sutured into place. Rectangular Flap Text: The defect edges were debeveled with a #15 scalpel blade. Given the location of the defect and the proximity to free margins a rectangular flap was deemed most appropriate. Using a sterile surgical marker, an appropriate rectangular flap was drawn incorporating the defect. The area thus outlined was incised deep to adipose tissue with a #15 scalpel blade. The skin margins were undermined to an appropriate distance in all directions utilizing iris scissors. Following this, the designed flap was carried over into the primary defect and sutured into place. Rhombic Flap Text: The defect edges were debeveled with a #15 scalpel blade. Given the location of the defect and the proximity to free margins a rhombic flap was deemed most appropriate. Using a sterile surgical marker, an appropriate rhombic flap was drawn incorporating the defect. The area thus outlined was incised deep to adipose tissue with a #15 scalpel blade. The skin margins were undermined to an appropriate distance in all directions utilizing iris scissors. Following this, the designed flap was carried over into the primary defect and sutured into place. Rhomboid Transposition Flap Text: The defect edges were debeveled with a #15 scalpel blade. Given the location of the defect and the proximity to free margins a rhomboid transposition flap was deemed most appropriate. Using a sterile surgical marker, an appropriate rhomboid flap was drawn incorporating the defect. The area thus outlined was incised deep to adipose tissue with a #15 scalpel blade. The skin margins were undermined to an appropriate distance in all directions utilizing iris scissors. Following this, the designed flap was carried over into the primary defect and sutured into place. Bi-Rhombic Flap Text: The defect edges were debeveled with a #15 scalpel blade. Given the location of the defect and the proximity to free margins a bi-rhombic flap was deemed most appropriate. Using a sterile surgical marker, an appropriate rhombic flap was drawn incorporating the defect. The area thus outlined was incised deep to adipose tissue with a #15 scalpel blade. The skin margins were undermined to an appropriate distance in all directions utilizing iris scissors. Following this, the designed flap was carried over into the primary defect and sutured into place. Helical Rim Advancement Flap Text: The defect edges were debeveled with a #15 blade scalpel.  Given the location of the defect and the proximity to free margins (helical rim) a double helical rim advancement flap was deemed most appropriate. Using a sterile surgical marker, the appropriate advancement flaps were drawn incorporating the defect and placing the expected incisions between the helical rim and antihelix where possible.  The area thus outlined was incised through and through with a #15 scalpel blade.  With a skin hook and iris scissors, the flaps were gently and sharply undermined and freed up. Folllowing this, the designed flaps were carried over into the primary defect and sutured into place. Bilateral Helical Rim Advancement Flap Text: The defect edges were debeveled with a #15 blade scalpel.  Given the location of the defect and the proximity to free margins (helical rim) a bilateral helical rim advancement flap was deemed most appropriate. Using a sterile surgical marker, the appropriate advancement flaps were drawn incorporating the defect and placing the expected incisions between the helical rim and antihelix where possible.  The area thus outlined was incised through and through with a #15 scalpel blade.  With a skin hook and iris scissors, the flaps were gently and sharply undermined and freed up. Following this, the designed flaps were placed into the primary defect and sutured into place. Ear Star Wedge Flap Text: The defect edges were debeveled with a #15 blade scalpel.  Given the location of the defect and the proximity to free margins (helical rim) an ear star wedge flap was deemed most appropriate. Using a sterile surgical marker, the appropriate flap was drawn incorporating the defect and placing the expected incisions between the helical rim and antihelix where possible.  The area thus outlined was incised through and through with a #15 scalpel blade. Following this, the designed flap was carried over into the primary defect and sutured into place. Flip-Flop Flap Text: The defect edges were debeveled with a #15 blade scalpel.  Given the location of the defect and the proximity to free margins a flip-flop flap was deemed most appropriate. Using a sterile surgical marker, the appropriate flap was drawn incorporating the defect and placing the expected incisions between the helical rim and antihelix where possible.  The area thus outlined was incised through and through with a #15 scalpel blade. Following this, the designed flap was carried over into the primary defect and sutured into place. Banner Transposition Flap Text: The defect edges were debeveled with a #15 scalpel blade. Given the location of the defect and the proximity to free margins a Banner transposition flap was deemed most appropriate. Using a sterile surgical marker, an appropriate flap was drawn around the defect. The area thus outlined was incised deep to adipose tissue with a #15 scalpel blade. The skin margins were undermined to an appropriate distance in all directions utilizing iris scissors. Following this, the designed flap was carried into the primary defect and sutured into place. Bilobed Flap Text: The defect edges were debeveled with a #15 scalpel blade. Given the location of the defect and the proximity to free margins a bilobe flap was deemed most appropriate. Using a sterile surgical marker, an appropriate bilobe flap drawn around the defect. The area thus outlined was incised deep to adipose tissue with a #15 scalpel blade. The skin margins were undermined to an appropriate distance in all directions utilizing iris scissors. Following this, the designed flap was carried over into the primary defect and sutured into place. Bilobed Transposition Flap Text: The defect edges were debeveled with a #15 scalpel blade. Given the location of the defect and the proximity to free margins a bilobed transposition flap was deemed most appropriate. Using a sterile surgical marker, an appropriate bilobe flap drawn around the defect. The area thus outlined was incised deep to adipose tissue with a #15 scalpel blade. The skin margins were undermined to an appropriate distance in all directions utilizing iris scissors. Following this, the designed flap was carried over into the primary defect and sutured into place. Trilobed Flap Text: The defect edges were debeveled with a #15 scalpel blade. Given the location of the defect and the proximity to free margins a trilobed flap was deemed most appropriate. Using a sterile surgical marker, an appropriate trilobed flap was drawn around the defect. The area thus outlined was incised deep to adipose tissue with a #15 scalpel blade. The skin margins were undermined to an appropriate distance in all directions utilizing iris scissors. Following this, the designed flap was carried into the primary defect and sutured into place. Dorsal Nasal Flap Text: The defect edges were debeveled with a #15 scalpel blade. Given the location of the defect and the proximity to free margins a dorsal nasal flap was deemed most appropriate. Using a sterile surgical marker, an appropriate dorsal nasal flap was drawn around the defect. The area thus outlined was incised deep to adipose tissue with a #15 scalpel blade. The skin margins were undermined to an appropriate distance in all directions utilizing iris scissors. Following this, the designed flap was carried into the primary defect and sutured into place. Island Pedicle Flap Text: The defect edges were debeveled with a #15 scalpel blade. Given the location of the defect, shape of the defect and the proximity to free margins an island pedicle advancement flap was deemed most appropriate. Using a sterile surgical marker, an appropriate advancement flap was drawn incorporating the defect, outlining the appropriate donor tissue and placing the expected incisions within the relaxed skin tension lines where possible. The area thus outlined was incised deep to adipose tissue with a #15 scalpel blade. The skin margins were undermined to an appropriate distance in all directions around the primary defect and laterally outward around the island pedicle utilizing iris scissors.  There was minimal undermining beneath the pedicle flap. Following this, the flap was carried over into the primary defect and sutured into place. Island Pedicle Flap With Canthal Suspension Text: The defect edges were debeveled with a #15 scalpel blade. Given the location of the defect, shape of the defect and the proximity to free margins an island pedicle advancement flap was deemed most appropriate. Using a sterile surgical marker, an appropriate advancement flap was drawn incorporating the defect, outlining the appropriate donor tissue and placing the expected incisions within the relaxed skin tension lines where possible. The area thus outlined was incised deep to adipose tissue with a #15 scalpel blade. The skin margins were undermined to an appropriate distance in all directions around the primary defect and laterally outward around the island pedicle utilizing iris scissors.  There was minimal undermining beneath the pedicle flap. A suspension suture was placed in the canthal tendon to prevent tension and prevent ectropion. Following this, the designed flap was placed into the primary defect and sutured into place. Alar Island Pedicle Flap Text: The defect edges were debeveled with a #15 scalpel blade. Given the location of the defect, shape of the defect and the proximity to the alar rim an island pedicle advancement flap was deemed most appropriate. Using a sterile surgical marker, an appropriate advancement flap was drawn incorporating the defect, outlining the appropriate donor tissue and placing the expected incisions within the nasal ala running parallel to the alar rim. The area thus outlined was incised with a #15 scalpel blade. The skin margins were undermined minimally to an appropriate distance in all directions around the primary defect and laterally outward around the island pedicle utilizing iris scissors.  There was minimal undermining beneath the pedicle flap. Following this, the designed flap was carried over into the primary defect and sutured into place. Double Island Pedicle Flap Text: The defect edges were debeveled with a #15 scalpel blade. Given the location of the defect, shape of the defect and the proximity to free margins a double island pedicle advancement flap was deemed most appropriate. Using a sterile surgical marker, an appropriate advancement flap was drawn incorporating the defect, outlining the appropriate donor tissue and placing the expected incisions within the relaxed skin tension lines where possible. The area thus outlined was incised deep to adipose tissue with a #15 scalpel blade. The skin margins were undermined to an appropriate distance in all directions around the primary defect and laterally outward around the island pedicle utilizing iris scissors.  There was minimal undermining beneath the pedicle flap. Following this, the flap was carried over into the primary defect and sutured into place. Island Pedicle Flap-Requiring Vessel Identification Text: The defect edges were debeveled with a #15 scalpel blade. Given the location of the defect, shape of the defect and the proximity to free margins an island pedicle advancement flap was deemed most appropriate. Using a sterile surgical marker, an appropriate advancement flap was drawn, based on the axial vessel mentioned above, incorporating the defect, outlining the appropriate donor tissue and placing the expected incisions within the relaxed skin tension lines where possible. The area thus outlined was incised deep to adipose tissue with a #15 scalpel blade. The skin margins were undermined to an appropriate distance in all directions around the primary defect and laterally outward around the island pedicle utilizing iris scissors.  There was minimal undermining beneath the pedicle flap. Following this, the designed flap was carried over into the primary defect and sutured into place. Keystone Flap Text: The defect edges were debeveled with a #15 scalpel blade. Given the location of the defect, shape of the defect a keystone flap was deemed most appropriate. Using a sterile surgical marker, an appropriate keystone flap was drawn incorporating the defect, outlining the appropriate donor tissue and placing the expected incisions within the relaxed skin tension lines where possible. The area thus outlined was incised deep to adipose tissue with a #15 scalpel blade. The skin margins were undermined to an appropriate distance in all directions around the primary defect and laterally outward around the flap utilizing iris scissors. Following this, the designed flap was carried into the primary defect and sutured into place. O-T Plasty Text: The defect edges were debeveled with a #15 scalpel blade. Given the location of the defect, shape of the defect and the proximity to free margins an O-T plasty was deemed most appropriate. Using a sterile surgical marker, an appropriate O-T plasty was drawn incorporating the defect and placing the expected incisions within the relaxed skin tension lines where possible. The area thus outlined was incised deep to adipose tissue with a #15 scalpel blade. The skin margins were undermined to an appropriate distance in all directions utilizing iris scissors. Following this, the designed flap was carried over into the primary defect and sutured into place. O-Z Plasty Text: The defect edges were debeveled with a #15 scalpel blade. Given the location of the defect, shape of the defect and the proximity to free margins an O-Z plasty (double transposition flap) was deemed most appropriate. Using a sterile surgical marker, the appropriate transposition flaps were drawn incorporating the defect and placing the expected incisions within the relaxed skin tension lines where possible. The area thus outlined was incised deep to adipose tissue with a #15 scalpel blade. The skin margins were undermined to an appropriate distance in all directions utilizing iris scissors. Hemostasis was achieved with electrocautery. The flaps were then transposed and carried over into place, one clockwise and the other counterclockwise, and anchored with interrupted buried subcutaneous sutures. Double O-Z Plasty Text: The defect edges were debeveled with a #15 scalpel blade. Given the location of the defect, shape of the defect and the proximity to free margins a Double O-Z plasty (double transposition flap) was deemed most appropriate. Using a sterile surgical marker, the appropriate transposition flaps were drawn incorporating the defect and placing the expected incisions within the relaxed skin tension lines where possible. The area thus outlined was incised deep to adipose tissue with a #15 scalpel blade. The skin margins were undermined to an appropriate distance in all directions utilizing iris scissors. Hemostasis was achieved with electrocautery. The flaps were then transposed and carried over into place, one clockwise and the other counterclockwise, and anchored with interrupted buried subcutaneous sutures. V-Y Plasty Text: The defect edges were debeveled with a #15 scalpel blade. Given the location of the defect, shape of the defect and the proximity to free margins an V-Y advancement flap was deemed most appropriate. Using a sterile surgical marker, an appropriate advancement flap was drawn incorporating the defect and placing the expected incisions within the relaxed skin tension lines where possible. The area thus outlined was incised deep to adipose tissue with a #15 scalpel blade. The skin margins were undermined to an appropriate distance in all directions utilizing iris scissors. Following this, the designed flap was advanced and carried over into the primary defect and sutured into place. H Plasty Text: Given the location of the defect, shape of the defect and the proximity to free margins a H-plasty was deemed most appropriate for repair. Using a sterile surgical marker, the appropriate advancement arms of the H-plasty were drawn incorporating the defect and placing the expected incisions within the relaxed skin tension lines where possible. The area thus outlined was incised deep to adipose tissue with a #15 scalpel blade. The skin margins were undermined to an appropriate distance in all directions utilizing iris scissors.  The opposing advancement arms were then advanced and carried over into place in opposite direction and anchored with interrupted buried subcutaneous sutures. W Plasty Text: The lesion was extirpated to the level of the fat with a #15 scalpel blade. Given the location of the defect, shape of the defect and the proximity to free margins a W-plasty was deemed most appropriate for repair. Using a sterile surgical marker, the appropriate transposition arms of the W-plasty were drawn incorporating the defect and placing the expected incisions within the relaxed skin tension lines where possible. The area thus outlined was incised deep to adipose tissue with a #15 scalpel blade. The skin margins were undermined to an appropriate distance in all directions utilizing iris scissors. The opposing transposition arms were then transposed and carried over into place in opposite direction and anchored with interrupted buried subcutaneous sutures. Z Plasty Text: The lesion was extirpated to the level of the fat with a #15 scalpel blade. Given the location of the defect, shape of the defect and the proximity to free margins a Z-plasty was deemed most appropriate for repair. Using a sterile surgical marker, the appropriate transposition arms of the Z-plasty were drawn incorporating the defect and placing the expected incisions within the relaxed skin tension lines where possible. The area thus outlined was incised deep to adipose tissue with a #15 scalpel blade. The skin margins were undermined to an appropriate distance in all directions utilizing iris scissors. The opposing transposition arms were then transposed and carried over into place in opposite direction and anchored with interrupted buried subcutaneous sutures. Double Z Plasty Text: The lesion was extirpated to the level of the fat with a #15 scalpel blade. Given the location of the defect, shape of the defect and the proximity to free margins a double Z-plasty was deemed most appropriate for repair. Using a sterile surgical marker, the appropriate transposition arms of the double Z-plasty were drawn incorporating the defect and placing the expected incisions within the relaxed skin tension lines where possible. The area thus outlined was incised deep to adipose tissue with a #15 scalpel blade. The skin margins were undermined to an appropriate distance in all directions utilizing iris scissors. The opposing transposition arms were then transposed and carried over into place in opposite direction and anchored with interrupted buried subcutaneous sutures. Zygomaticofacial Flap Text: Given the location of the defect, shape of the defect and the proximity to free margins a zygomaticofacial flap was deemed most appropriate for repair. Using a sterile surgical marker, the appropriate flap was drawn incorporating the defect and placing the expected incisions within the relaxed skin tension lines where possible. The area thus outlined was incised deep to adipose tissue with a #15 scalpel blade with preservation of a vascular pedicle.  The skin margins were undermined to an appropriate distance in all directions utilizing iris scissors. The flap was then carried over into the defect and anchored with interrupted buried subcutaneous sutures. Cheek Interpolation Flap Text: A decision was made to reconstruct the defect utilizing an interpolation axial flap and a staged reconstruction.  A telfa template was made of the defect.  This telfa template was then used to outline the Cheek Interpolation flap.  The donor area for the pedicle flap was then injected with anesthesia.  The flap was excised through the skin and subcutaneous tissue down to the layer of the underlying musculature.  The interpolation flap was carefully excised within this deep plane to maintain its blood supply.  The edges of the donor site were undermined.   The donor site was closed in a primary fashion.  The pedicle was then rotated into position and sutured.  Once the tube was sutured into place, adequate blood supply was confirmed with blanching and refill.  The pedicle was then wrapped with xeroform gauze and dressed appropriately with a telfa and gauze bandage to ensure continued blood supply and protect the attached pedicle. Cheek-To-Nose Interpolation Flap Text: A decision was made to reconstruct the defect utilizing an interpolation axial flap and a staged reconstruction.  A telfa template was made of the defect.  This telfa template was then used to outline the Cheek-To-Nose Interpolation flap.  The donor area for the pedicle flap was then injected with anesthesia.  The flap was excised through the skin and subcutaneous tissue down to the layer of the underlying musculature.  The interpolation flap was carefully excised within this deep plane to maintain its blood supply.  The edges of the donor site were undermined.   The donor site was closed in a primary fashion.  The pedicle was then rotated into position and sutured.  Once the tube was sutured into place, adequate blood supply was confirmed with blanching and refill.  The pedicle was then wrapped with xeroform gauze and dressed appropriately with a telfa and gauze bandage to ensure continued blood supply and protect the attached pedicle. Interpolation Flap Text: A decision was made to reconstruct the defect utilizing an interpolation axial flap and a staged reconstruction.  A telfa template was made of the defect.  This telfa template was then used to outline the interpolation flap.  The donor area for the pedicle flap was then injected with anesthesia.  The flap was excised through the skin and subcutaneous tissue down to the layer of the underlying musculature.  The interpolation flap was carefully excised within this deep plane to maintain its blood supply.  The edges of the donor site were undermined.   The donor site was closed in a primary fashion.  The pedicle was then rotated into position and sutured.  Once the tube was sutured into place, adequate blood supply was confirmed with blanching and refill.  The pedicle was then wrapped with xeroform gauze and dressed appropriately with a telfa and gauze bandage to ensure continued blood supply and protect the attached pedicle. Melolabial Interpolation Flap Text: A decision was made to reconstruct the defect utilizing an interpolation axial flap and a staged reconstruction.  A telfa template was made of the defect.  This telfa template was then used to outline the melolabial interpolation flap.  The donor area for the pedicle flap was then injected with anesthesia.  The flap was excised through the skin and subcutaneous tissue down to the layer of the underlying musculature.  The pedicle flap was carefully excised within this deep plane to maintain its blood supply.  The edges of the donor site were undermined.   The donor site was closed in a primary fashion.  The pedicle was then rotated into position and sutured.  Once the tube was sutured into place, adequate blood supply was confirmed with blanching and refill.  The pedicle was then wrapped with xeroform gauze and dressed appropriately with a telfa and gauze bandage to ensure continued blood supply and protect the attached pedicle. Mastoid Interpolation Flap Text: A decision was made to reconstruct the defect utilizing an interpolation axial flap and a staged reconstruction.  A telfa template was made of the defect.  This telfa template was then used to outline the mastoid interpolation flap.  The donor area for the pedicle flap was then injected with anesthesia.  The flap was excised through the skin and subcutaneous tissue down to the layer of the underlying musculature.  The pedicle flap was carefully excised within this deep plane to maintain its blood supply.  The edges of the donor site were undermined.   The donor site was closed in a primary fashion.  The pedicle was then rotated into position and sutured.  Once the tube was sutured into place, adequate blood supply was confirmed with blanching and refill.  The pedicle was then wrapped with xeroform gauze and dressed appropriately with a telfa and gauze bandage to ensure continued blood supply and protect the attached pedicle. Posterior Auricular Interpolation Flap Text: A decision was made to reconstruct the defect utilizing an interpolation axial flap and a staged reconstruction.  A telfa template was made of the defect.  This telfa template was then used to outline the posterior auricular interpolation flap.  The donor area for the pedicle flap was then injected with anesthesia.  The flap was excised through the skin and subcutaneous tissue down to the layer of the underlying musculature.  The pedicle flap was carefully excised within this deep plane to maintain its blood supply.  The edges of the donor site were undermined.   The donor site was closed in a primary fashion.  The pedicle was then rotated into position and sutured.  Once the tube was sutured into place, adequate blood supply was confirmed with blanching and refill.  The pedicle was then wrapped with xeroform gauze and dressed appropriately with a telfa and gauze bandage to ensure continued blood supply and protect the attached pedicle. Paramedian Forehead Flap Text: A decision was made to reconstruct the defect utilizing an interpolation axial flap and a staged reconstruction.  A telfa template was made of the defect.  This telfa template was then used to outline the paramedian forehead pedicle flap.  The donor area for the pedicle flap was then injected with anesthesia.  The flap was excised through the skin and subcutaneous tissue down to the layer of the underlying musculature.  The pedicle flap was carefully excised within this deep plane to maintain its blood supply.  The edges of the donor site were undermined.   The donor site was closed in a primary fashion.  The pedicle was then rotated into position and sutured.  Once the tube was sutured into place, adequate blood supply was confirmed with blanching and refill.  The pedicle was then wrapped with xeroform gauze and dressed appropriately with a telfa and gauze bandage to ensure continued blood supply and protect the attached pedicle. Abbe Flap (Upper To Lower Lip) Text: The defect of the lower lip was assessed and measured.  Given the location and size of the defect, an Abbe flap was deemed most appropriate. Using a sterile surgical marker, an appropriate Abbe flap was measured and drawn on the upper lip. Local anesthesia was then infiltrated.  A scalpel was then used to incise the upper lip through and through the skin, vermilion, muscle and mucosa, leaving the flap pedicled on the opposite side.  The flap was then rotated and transferred to the lower lip defect.  The flap was then sutured into place with a three layer technique, closing the orbicularis oris muscle layer with subcutaneous buried sutures, followed by a mucosal layer and an epidermal layer. Abbe Flap (Lower To Upper Lip) Text: The defect of the upper lip was assessed and measured.  Given the location and size of the defect, an Abbe flap was deemed most appropriate. Using a sterile surgical marker, an appropriate Abbe flap was measured and drawn on the lower lip. Local anesthesia was then infiltrated. A scalpel was then used to incise the upper lip through and through the skin, vermilion, muscle and mucosa, leaving the flap pedicled on the opposite side.  The flap was then rotated and transferred to the lower lip defect.  The flap was then sutured into place with a three layer technique, closing the orbicularis oris muscle layer with subcutaneous buried sutures, followed by a mucosal layer and an epidermal layer. Estlander Flap (Upper To Lower Lip) Text: The defect of the lower lip was assessed and measured.  Given the location and size of the defect, an Estlander flap was deemed most appropriate. Using a sterile surgical marker, an appropriate Estlander flap was measured and drawn on the upper lip. Local anesthesia was then infiltrated. A scalpel was then used to incise the lateral aspect of the flap, through skin, muscle and mucosa, leaving the flap pedicled medially.  The flap was then rotated and positioned to fill the lower lip defect.  The flap was then sutured into place with a three layer technique, closing the orbicularis oris muscle layer with subcutaneous buried sutures, followed by a mucosal layer and an epidermal layer. Lip Wedge Excision Repair Text: Given the location of the defect and the proximity to free margins a full thickness wedge repair was deemed most appropriate. Using a sterile surgical marker, the appropriate repair was drawn incorporating the defect and placing the expected incisions perpendicular to the vermilion border.  The vermilion border was also meticulously outlined to ensure appropriate reapproximation during the repair.  The area thus outlined was incised through and through with a #15 scalpel blade.  The muscularis and dermis were reaproximated with deep sutures following hemostasis. Care was taken to realign the vermilion border before proceeding with the superficial closure.  Once the vermilion was realigned the superfical and mucosal closure was finished. Ftsg Text: The defect edges were debeveled with a #15 scalpel blade. Given the location of the defect, shape of the defect and the proximity to free margins a full thickness skin graft was deemed most appropriate. Using a sterile surgical marker, the primary defect shape was transferred to the donor site. The area thus outlined was incised deep to adipose tissue with a #15 scalpel blade.  The harvested graft was then trimmed of adipose tissue until only dermis and epidermis was left.  The skin margins of the secondary defect were undermined to an appropriate distance in all directions utilizing iris scissors.  The secondary defect was closed with interrupted buried subcutaneous sutures.  The skin edges were then re-apposed with running  sutures.  The skin graft was then placed in the primary defect and oriented appropriately. Split-Thickness Skin Graft Text: The defect edges were debeveled with a #15 scalpel blade. Given the location of the defect, shape of the defect and the proximity to free margins a split thickness skin graft was deemed most appropriate. Using a sterile surgical marker, the primary defect shape was transferred to the donor site. The split thickness graft was then harvested.  The skin graft was then placed in the primary defect and oriented appropriately. Pinch Graft Text: The defect edges were debeveled with a #15 scalpel blade. Given the location of the defect, shape of the defect and the proximity to free margins a pinch graft was deemed most appropriate. Using a sterile surgical marker, the primary defect shape was transferred to the donor site. The area thus outlined was incised deep to adipose tissue with a #15 scalpel blade.  The harvested graft was then trimmed of adipose tissue until only dermis and epidermis was left. The skin margins of the secondary defect were undermined to an appropriate distance in all directions utilizing iris scissors.  The secondary defect was closed with interrupted buried subcutaneous sutures.  The skin edges were then re-apposed with running  sutures.  The skin graft was then placed in the primary defect and oriented appropriately. Burow's Graft Text: The defect edges were debeveled with a #15 scalpel blade. Given the location of the defect, shape of the defect, the proximity to free margins and the presence of a standing cone deformity a Burow's skin graft was deemed most appropriate. The standing cone was removed and this tissue was then trimmed to the shape of the primary defect. The adipose tissue was also removed until only dermis and epidermis were left.  The skin margins of the secondary defect were undermined to an appropriate distance in all directions utilizing iris scissors.  The secondary defect was closed with interrupted buried subcutaneous sutures.  The skin edges were then re-apposed with running  sutures.  The skin graft was then placed in the primary defect and oriented appropriately. Cartilage Graft Text: The defect edges were debeveled with a #15 scalpel blade. Given the location of the defect, shape of the defect, the fact the defect involved a full thickness cartilage defect a cartilage graft was deemed most appropriate.  An appropriate donor site was identified, cleansed, and anesthetized. The cartilage graft was then harvested and transferred to the recipient site, oriented appropriately and then sutured into place.  The secondary defect was then repaired using a primary closure. Composite Graft Text: The defect edges were debeveled with a #15 scalpel blade. Given the location of the defect, shape of the defect, the proximity to free margins and the fact the defect was full thickness a composite graft was deemed most appropriate.  The defect was outline and then transferred to the donor site.  A full thickness graft was then excised from the donor site. The graft was then placed in the primary defect, oriented appropriately and then sutured into place.  The secondary defect was then repaired using a primary closure. Epidermal Autograft Text: The defect edges were debeveled with a #15 scalpel blade. Given the location of the defect, shape of the defect and the proximity to free margins an epidermal autograft was deemed most appropriate. Using a sterile surgical marker, the primary defect shape was transferred to the donor site. The epidermal graft was then harvested.  The skin graft was then placed in the primary defect and oriented appropriately. Dermal Autograft Text: The defect edges were debeveled with a #15 scalpel blade. Given the location of the defect, shape of the defect and the proximity to free margins a dermal autograft was deemed most appropriate. Using a sterile surgical marker, the primary defect shape was transferred to the donor site. The area thus outlined was incised deep to adipose tissue with a #15 scalpel blade.  The harvested graft was then trimmed of adipose and epidermal tissue until only dermis was left.  The skin graft was then placed in the primary defect and oriented appropriately. Skin Substitute Text: The defect edges were debeveled with a #15 scalpel blade. Given the location of the defect, shape of the defect and the proximity to free margins a skin substitute graft was deemed most appropriate.  The graft material was trimmed to fit the size of the defect. The graft was then placed in the primary defect and oriented appropriately. Tissue Cultured Epidermal Autograft Text: The defect edges were debeveled with a #15 scalpel blade. Given the location of the defect, shape of the defect and the proximity to free margins a tissue cultured epidermal autograft was deemed most appropriate.  The graft was then trimmed to fit the size of the defect.  The graft was then placed in the primary defect and oriented appropriately. Xenograft Text: The defect edges were debeveled with a #15 scalpel blade. Given the location of the defect, shape of the defect and the proximity to free margins a xenograft was deemed most appropriate.  The graft was then trimmed to fit the size of the defect.  The graft was then placed in the primary defect and oriented appropriately. Purse String (Intermediate) Text: Given the location of the defect and the characteristics of the surrounding skin a purse string intermediate closure was deemed most appropriate.  Undermining was performed circumferentially around the surgical defect.  A purse string suture was then placed and tightened. Purse String (Simple) Text: Given the location of the defect and the characteristics of the surrounding skin a purse string simple closure was deemed most appropriate.  Undermining was performed circumferentially around the surgical defect.  A purse string suture was then placed and tightened. Partial Purse String (Intermediate) Text: Given the location of the defect and the characteristics of the surrounding skin an intermediate purse string closure was deemed most appropriate.  Undermining was performed circumferentially around the surgical defect.  A purse string suture was then placed and tightened. Wound tension of the circular defect prevented complete closure of the wound. Partial Purse String (Simple) Text: Given the location of the defect and the characteristics of the surrounding skin a simple purse string closure was deemed most appropriate.  Undermining was performed circumferentially around the surgical defect.  A purse string suture was then placed and tightened. Wound tension of the circular defect prevented complete closure of the wound. Complex Repair And Single Advancement Flap Text: The defect edges were debeveled with a #15 scalpel blade.  The primary defect was closed partially with a complex linear closure.  Given the location of the remaining defect, shape of the defect and the proximity to free margins a single advancement flap was deemed most appropriate for complete closure of the defect.  Using a sterile surgical marker, an appropriate advancement flap was drawn incorporating the defect and placing the expected incisions within the relaxed skin tension lines where possible. The area thus outlined was incised deep to adipose tissue with a #15 scalpel blade. The skin margins were undermined to an appropriate distance in all directions utilizing iris scissors and carried over to close the primary defect. Complex Repair And Double Advancement Flap Text: The defect edges were debeveled with a #15 scalpel blade.  The primary defect was closed partially with a complex linear closure.  Given the location of the remaining defect, shape of the defect and the proximity to free margins a double advancement flap was deemed most appropriate for complete closure of the defect.  Using a sterile surgical marker, an appropriate advancement flap was drawn incorporating the defect and placing the expected incisions within the relaxed skin tension lines where possible. The area thus outlined was incised deep to adipose tissue with a #15 scalpel blade. The skin margins were undermined to an appropriate distance in all directions utilizing iris scissors and carried over to close the primary defect. Complex Repair And Modified Advancement Flap Text: The defect edges were debeveled with a #15 scalpel blade.  The primary defect was closed partially with a complex linear closure.  Given the location of the remaining defect, shape of the defect and the proximity to free margins a modified advancement flap was deemed most appropriate for complete closure of the defect.  Using a sterile surgical marker, an appropriate advancement flap was drawn incorporating the defect and placing the expected incisions within the relaxed skin tension lines where possible. The area thus outlined was incised deep to adipose tissue with a #15 scalpel blade. The skin margins were undermined to an appropriate distance in all directions utilizing iris scissors and carried over to close the primary defect. Complex Repair And A-T Advancement Flap Text: The defect edges were debeveled with a #15 scalpel blade.  The primary defect was closed partially with a complex linear closure.  Given the location of the remaining defect, shape of the defect and the proximity to free margins an A-T advancement flap was deemed most appropriate for complete closure of the defect.  Using a sterile surgical marker, an appropriate advancement flap was drawn incorporating the defect and placing the expected incisions within the relaxed skin tension lines where possible. The area thus outlined was incised deep to adipose tissue with a #15 scalpel blade. The skin margins were undermined to an appropriate distance in all directions utilizing iris scissors and carried over to close the primary defect. Complex Repair And O-T Advancement Flap Text: The defect edges were debeveled with a #15 scalpel blade.  The primary defect was closed partially with a complex linear closure.  Given the location of the remaining defect, shape of the defect and the proximity to free margins an O-T advancement flap was deemed most appropriate for complete closure of the defect.  Using a sterile surgical marker, an appropriate advancement flap was drawn incorporating the defect and placing the expected incisions within the relaxed skin tension lines where possible. The area thus outlined was incised deep to adipose tissue with a #15 scalpel blade. The skin margins were undermined to an appropriate distance in all directions utilizing iris scissors and carried over to close the primary defect. Complex Repair And O-L Flap Text: The defect edges were debeveled with a #15 scalpel blade.  The primary defect was closed partially with a complex linear closure.  Given the location of the remaining defect, shape of the defect and the proximity to free margins an O-L flap was deemed most appropriate for complete closure of the defect.  Using a sterile surgical marker, an appropriate flap was drawn incorporating the defect and placing the expected incisions within the relaxed skin tension lines where possible. The area thus outlined was incised deep to adipose tissue with a #15 scalpel blade. The skin margins were undermined to an appropriate distance in all directions utilizing iris scissors and carried over to close the primary defect. Complex Repair And Bilobe Flap Text: The defect edges were debeveled with a #15 scalpel blade.  The primary defect was closed partially with a complex linear closure.  Given the location of the remaining defect, shape of the defect and the proximity to free margins a bilobe flap was deemed most appropriate for complete closure of the defect.  Using a sterile surgical marker, an appropriate advancement flap was drawn incorporating the defect and placing the expected incisions within the relaxed skin tension lines where possible. The area thus outlined was incised deep to adipose tissue with a #15 scalpel blade. The skin margins were undermined to an appropriate distance in all directions utilizing iris scissors and carried over to close the primary defect. Complex Repair And Melolabial Flap Text: The defect edges were debeveled with a #15 scalpel blade.  The primary defect was closed partially with a complex linear closure.  Given the location of the remaining defect, shape of the defect and the proximity to free margins a melolabial flap was deemed most appropriate for complete closure of the defect.  Using a sterile surgical marker, an appropriate advancement flap was drawn incorporating the defect and placing the expected incisions within the relaxed skin tension lines where possible. The area thus outlined was incised deep to adipose tissue with a #15 scalpel blade. The skin margins were undermined to an appropriate distance in all directions utilizing iris scissors and carried over to close the primary defect. Complex Repair And Rotation Flap Text: The defect edges were debeveled with a #15 scalpel blade.  The primary defect was closed partially with a complex linear closure.  Given the location of the remaining defect, shape of the defect and the proximity to free margins a rotation flap was deemed most appropriate for complete closure of the defect.  Using a sterile surgical marker, an appropriate advancement flap was drawn incorporating the defect and placing the expected incisions within the relaxed skin tension lines where possible. The area thus outlined was incised deep to adipose tissue with a #15 scalpel blade. The skin margins were undermined to an appropriate distance in all directions utilizing iris scissors and carried over to close the primary defect. Complex Repair And Rhombic Flap Text: The defect edges were debeveled with a #15 scalpel blade.  The primary defect was closed partially with a complex linear closure.  Given the location of the remaining defect, shape of the defect and the proximity to free margins a rhombic flap was deemed most appropriate for complete closure of the defect.  Using a sterile surgical marker, an appropriate advancement flap was drawn incorporating the defect and placing the expected incisions within the relaxed skin tension lines where possible. The area thus outlined was incised deep to adipose tissue with a #15 scalpel blade. The skin margins were undermined to an appropriate distance in all directions utilizing iris scissors and carried over to close the primary defect. Complex Repair And Transposition Flap Text: The defect edges were debeveled with a #15 scalpel blade.  The primary defect was closed partially with a complex linear closure.  Given the location of the remaining defect, shape of the defect and the proximity to free margins a transposition flap was deemed most appropriate for complete closure of the defect.  Using a sterile surgical marker, an appropriate advancement flap was drawn incorporating the defect and placing the expected incisions within the relaxed skin tension lines where possible. The area thus outlined was incised deep to adipose tissue with a #15 scalpel blade. The skin margins were undermined to an appropriate distance in all directions utilizing iris scissors and carried over to close the primary defect. Complex Repair And V-Y Plasty Text: The defect edges were debeveled with a #15 scalpel blade.  The primary defect was closed partially with a complex linear closure.  Given the location of the remaining defect, shape of the defect and the proximity to free margins a V-Y plasty was deemed most appropriate for complete closure of the defect.  Using a sterile surgical marker, an appropriate advancement flap was drawn incorporating the defect and placing the expected incisions within the relaxed skin tension lines where possible. The area thus outlined was incised deep to adipose tissue with a #15 scalpel blade. The skin margins were undermined to an appropriate distance in all directions utilizing iris scissors and carried over to close the primary defect. Complex Repair And M Plasty Text: The defect edges were debeveled with a #15 scalpel blade.  The primary defect was closed partially with a complex linear closure.  Given the location of the remaining defect, shape of the defect and the proximity to free margins an M plasty was deemed most appropriate for complete closure of the defect.  Using a sterile surgical marker, an appropriate advancement flap was drawn incorporating the defect and placing the expected incisions within the relaxed skin tension lines where possible. The area thus outlined was incised deep to adipose tissue with a #15 scalpel blade. The skin margins were undermined to an appropriate distance in all directions utilizing iris scissors and carried over to close the primary defect. Complex Repair And Double M Plasty Text: The defect edges were debeveled with a #15 scalpel blade.  The primary defect was closed partially with a complex linear closure.  Given the location of the remaining defect, shape of the defect and the proximity to free margins a double M plasty was deemed most appropriate for complete closure of the defect.  Using a sterile surgical marker, an appropriate advancement flap was drawn incorporating the defect and placing the expected incisions within the relaxed skin tension lines where possible. The area thus outlined was incised deep to adipose tissue with a #15 scalpel blade. The skin margins were undermined to an appropriate distance in all directions utilizing iris scissors and carried over to close the primary defect. Complex Repair And W Plasty Text: The defect edges were debeveled with a #15 scalpel blade.  The primary defect was closed partially with a complex linear closure.  Given the location of the remaining defect, shape of the defect and the proximity to free margins a W plasty was deemed most appropriate for complete closure of the defect.  Using a sterile surgical marker, an appropriate advancement flap was drawn incorporating the defect and placing the expected incisions within the relaxed skin tension lines where possible. The area thus outlined was incised deep to adipose tissue with a #15 scalpel blade. The skin margins were undermined to an appropriate distance in all directions utilizing iris scissors and carried over to close the primary defect. Complex Repair And Z Plasty Text: The defect edges were debeveled with a #15 scalpel blade.  The primary defect was closed partially with a complex linear closure.  Given the location of the remaining defect, shape of the defect and the proximity to free margins a Z plasty was deemed most appropriate for complete closure of the defect.  Using a sterile surgical marker, an appropriate advancement flap was drawn incorporating the defect and placing the expected incisions within the relaxed skin tension lines where possible. The area thus outlined was incised deep to adipose tissue with a #15 scalpel blade. The skin margins were undermined to an appropriate distance in all directions utilizing iris scissors and carried over to close the primary defect. Complex Repair And Dorsal Nasal Flap Text: The defect edges were debeveled with a #15 scalpel blade.  The primary defect was closed partially with a complex linear closure.  Given the location of the remaining defect, shape of the defect and the proximity to free margins a dorsal nasal flap was deemed most appropriate for complete closure of the defect.  Using a sterile surgical marker, an appropriate flap was drawn incorporating the defect and placing the expected incisions within the relaxed skin tension lines where possible. The area thus outlined was incised deep to adipose tissue with a #15 scalpel blade. The skin margins were undermined to an appropriate distance in all directions utilizing iris scissors and carried over to close the primary defect. Complex Repair And Ftsg Text: The defect edges were debeveled with a #15 scalpel blade.  The primary defect was closed partially with a complex linear closure.  Given the location of the defect, shape of the defect and the proximity to free margins a full thickness skin graft was deemed most appropriate to repair the remaining defect.  The graft was trimmed to fit the size of the remaining defect.  The graft was then placed in the primary defect, oriented appropriately, and sutured into place. Complex Repair And Burow's Graft Text: The defect edges were debeveled with a #15 scalpel blade.  The primary defect was closed partially with a complex linear closure.  Given the location of the defect, shape of the defect, the proximity to free margins and the presence of a standing cone deformity a Burow's graft was deemed most appropriate to repair the remaining defect.  The graft was trimmed to fit the size of the remaining defect.  The graft was then placed in the primary defect, oriented appropriately, and sutured into place. Complex Repair And Split-Thickness Skin Graft Text: The defect edges were debeveled with a #15 scalpel blade.  The primary defect was closed partially with a complex linear closure.  Given the location of the defect, shape of the defect and the proximity to free margins a split thickness skin graft was deemed most appropriate to repair the remaining defect.  The graft was trimmed to fit the size of the remaining defect.  The graft was then placed in the primary defect, oriented appropriately, and sutured into place. Complex Repair And Epidermal Autograft Text: The defect edges were debeveled with a #15 scalpel blade.  The primary defect was closed partially with a complex linear closure.  Given the location of the defect, shape of the defect and the proximity to free margins an epidermal autograft was deemed most appropriate to repair the remaining defect.  The graft was trimmed to fit the size of the remaining defect.  The graft was then placed in the primary defect, oriented appropriately, and sutured into place. Complex Repair And Dermal Autograft Text: The defect edges were debeveled with a #15 scalpel blade.  The primary defect was closed partially with a complex linear closure.  Given the location of the defect, shape of the defect and the proximity to free margins an dermal autograft was deemed most appropriate to repair the remaining defect.  The graft was trimmed to fit the size of the remaining defect.  The graft was then placed in the primary defect, oriented appropriately, and sutured into place. Complex Repair And Tissue Cultured Epidermal Autograft Text: The defect edges were debeveled with a #15 scalpel blade.  The primary defect was closed partially with a complex linear closure.  Given the location of the defect, shape of the defect and the proximity to free margins an tissue cultured epidermal autograft was deemed most appropriate to repair the remaining defect.  The graft was trimmed to fit the size of the remaining defect.  The graft was then placed in the primary defect, oriented appropriately, and sutured into place. Complex Repair And Xenograft Text: The defect edges were debeveled with a #15 scalpel blade.  The primary defect was closed partially with a complex linear closure.  Given the location of the defect, shape of the defect and the proximity to free margins a xenograft was deemed most appropriate to repair the remaining defect.  The graft was trimmed to fit the size of the remaining defect.  The graft was then placed in the primary defect, oriented appropriately, and sutured into place. Complex Repair And Skin Substitute Graft Text: The defect edges were debeveled with a #15 scalpel blade.  The primary defect was closed partially with a complex linear closure.  Given the location of the remaining defect, shape of the defect and the proximity to free margins a skin substitute graft was deemed most appropriate to repair the remaining defect.  The graft was trimmed to fit the size of the remaining defect.  The graft was then placed in the primary defect, oriented appropriately, and sutured into place. Include Anticoagulation In Mohs Note?: Please Select the Appropriate Response Path Notes (To The Dermatopathologist): Please check margins. Consent was obtained from the patient. The risks and benefits to therapy were discussed in detail. Specifically, the risks of infection, scarring, bleeding, prolonged wound healing, incomplete removal, allergy to anesthesia, nerve injury and recurrence were addressed. Prior to the procedure, the treatment site was clearly identified and confirmed by the patient. All components of Universal Protocol/PAUSE Rule completed. Post-Care Instructions: I reviewed with the patient in detail post-care instructions. Patient is not to engage in any heavy lifting, exercise, or swimming for the next 14 days. Should the patient develop any fevers, chills, bleeding, severe pain patient will contact the office immediately. Home Suture Removal Text: Patient was provided a home suture removal kit and will remove their sutures at home.  If they have any questions or difficulties they will call the office. Where Do You Want The Question To Include Opioid Counseling Located?: Case Summary Tab Information: Selecting Yes will display possible errors in your note based on the variables you have selected. This validation is only offered as a suggestion for you. PLEASE NOTE THAT THE VALIDATION TEXT WILL BE REMOVED WHEN YOU FINALIZE YOUR NOTE. IF YOU WANT TO FAX A PRELIMINARY NOTE YOU WILL NEED TO TOGGLE THIS TO 'NO' IF YOU DO NOT WANT IT IN YOUR FAXED NOTE.

## (undated) DEVICE — TUBING INSUFFLATION 10

## (undated) DEVICE — SUT SILK 0 STRANDS 30IN BLK

## (undated) DEVICE — SYR 10CC LUER LOCK

## (undated) DEVICE — SUT MCRYL PLUS 4-0 PS2 27IN

## (undated) DEVICE — DRAPE INCISE IOBAN 2 23X23IN

## (undated) DEVICE — SUT MONOCYRL 4-0 PS2 UND

## (undated) DEVICE — TROCAR ENDOPATH XCEL 5X100MM

## (undated) DEVICE — ELECTRODE BLADE INSULATED 1 IN

## (undated) DEVICE — TROCAR ENDOPATH XCEL 5X75MM

## (undated) DEVICE — NDL HYPO REG 25G X 1 1/2

## (undated) DEVICE — ELECTRODE REM PLYHSV RETURN 9

## (undated) DEVICE — GLOVE PROTEXIS HYDROGEL SZ7

## (undated) DEVICE — SEE MEDLINE ITEM 156952

## (undated) DEVICE — SOL IRR NACL .9% 1000CC

## (undated) DEVICE — APPLICATOR CHLORAPREP ORN 26ML

## (undated) DEVICE — MANIFOLD 4 PORT

## (undated) DEVICE — KIT ANTIFOG

## (undated) DEVICE — DRAPE ABDOMINAL TIBURON 14X11

## (undated) DEVICE — SUT 0 VICRYL / UR6 (J603)

## (undated) DEVICE — SPONGE DERMA 8PLY 2X2

## (undated) DEVICE — GAUZE SPONGE 4X4 12PLY

## (undated) DEVICE — DRESSING ANTIMICROBIAL 1 INCH

## (undated) DEVICE — STRIP STERI REIN CLSR 1/2X2IN

## (undated) DEVICE — PACK BASIC

## (undated) DEVICE — SEE MEDLINE ITEM 156955

## (undated) DEVICE — TROCAR ENDOPATH XCEL 8MM 10CM

## (undated) DEVICE — DEVICE PICC SECURE SORBA VIEW

## (undated) DEVICE — DRESSING TRANS 4X4 3/4

## (undated) DEVICE — SET CYSTO IRRIGATION UNIV SPIK

## (undated) DEVICE — SEE MEDLINE ITEM 152622

## (undated) DEVICE — TROCAR ENDOPATH XCEL 5MM 7.5CM

## (undated) DEVICE — COVER OVERHEAD SURG LT BLUE

## (undated) DEVICE — SEE MEDLINE ITEM 157116

## (undated) DEVICE — ADHESIVE DERMABOND ADVANCED

## (undated) DEVICE — DRESSING TEGADERM 2 3/8 X 2.75